# Patient Record
Sex: MALE | Race: WHITE | NOT HISPANIC OR LATINO | Employment: OTHER | URBAN - METROPOLITAN AREA
[De-identification: names, ages, dates, MRNs, and addresses within clinical notes are randomized per-mention and may not be internally consistent; named-entity substitution may affect disease eponyms.]

---

## 2023-08-21 PROBLEM — R82.90 ABNORMAL URINALYSIS: Status: ACTIVE | Noted: 2023-08-21

## 2023-08-21 PROBLEM — R97.20 ELEVATED PSA: Status: ACTIVE | Noted: 2023-08-21

## 2023-08-21 PROBLEM — C61 CANCER OF PROSTATE (MULTI): Status: ACTIVE | Noted: 2023-08-21

## 2023-08-21 PROBLEM — J31.0 RHINITIS: Status: ACTIVE | Noted: 2023-08-21

## 2023-08-21 PROBLEM — R73.03 PREDIABETES: Status: ACTIVE | Noted: 2023-08-21

## 2023-08-21 RX ORDER — FLUTICASONE PROPIONATE 50 MCG
2 SPRAY, SUSPENSION (ML) NASAL DAILY
COMMUNITY
Start: 2019-11-26 | End: 2024-06-04 | Stop reason: ALTCHOICE

## 2023-08-21 RX ORDER — PREDNISONE 5 MG/1
5 TABLET ORAL DAILY
COMMUNITY
Start: 2023-02-21 | End: 2023-10-04 | Stop reason: ALTCHOICE

## 2023-08-21 RX ORDER — CYANOCOBALAMIN (VITAMIN B-12) 500 MCG
400 TABLET ORAL DAILY
COMMUNITY
End: 2024-06-04 | Stop reason: WASHOUT

## 2023-08-21 RX ORDER — CETIRIZINE HYDROCHLORIDE 10 MG/1
10 TABLET ORAL DAILY
COMMUNITY
Start: 2019-11-26 | End: 2024-06-04 | Stop reason: ALTCHOICE

## 2023-10-02 ENCOUNTER — LAB (OUTPATIENT)
Dept: LAB | Facility: CLINIC | Age: 78
End: 2023-10-02
Payer: MEDICARE

## 2023-10-02 DIAGNOSIS — C61 PROSTATE CANCER (MULTI): ICD-10-CM

## 2023-10-02 DIAGNOSIS — C61 PROSTATE CANCER (MULTI): Primary | ICD-10-CM

## 2023-10-02 LAB
ALBUMIN SERPL BCP-MCNC: 4 G/DL (ref 3.4–5)
ALP SERPL-CCNC: 71 U/L (ref 33–136)
ALT SERPL W P-5'-P-CCNC: 14 U/L (ref 10–52)
ANION GAP SERPL CALC-SCNC: 10 MMOL/L (ref 10–20)
AST SERPL W P-5'-P-CCNC: 19 U/L (ref 9–39)
BASOPHILS # BLD AUTO: 0.03 X10*3/UL (ref 0–0.1)
BASOPHILS NFR BLD AUTO: 0.6 %
BILIRUB SERPL-MCNC: 0.4 MG/DL (ref 0–1.2)
BUN SERPL-MCNC: 23 MG/DL (ref 6–23)
CALCIUM SERPL-MCNC: 9.4 MG/DL (ref 8.6–10.3)
CHLORIDE SERPL-SCNC: 104 MMOL/L (ref 98–107)
CO2 SERPL-SCNC: 29 MMOL/L (ref 21–32)
CREAT SERPL-MCNC: 0.85 MG/DL (ref 0.5–1.3)
EOSINOPHIL # BLD AUTO: 0.28 X10*3/UL (ref 0–0.4)
EOSINOPHIL NFR BLD AUTO: 5.9 %
ERYTHROCYTE [DISTWIDTH] IN BLOOD BY AUTOMATED COUNT: 15.9 % (ref 11.5–14.5)
GFR SERPL CREATININE-BSD FRML MDRD: 90 ML/MIN/1.73M*2
GLUCOSE SERPL-MCNC: 97 MG/DL (ref 74–99)
HCT VFR BLD AUTO: 37.8 % (ref 41–52)
HGB BLD-MCNC: 12.2 G/DL (ref 13.5–17.5)
IMM GRANULOCYTES # BLD AUTO: 0.01 X10*3/UL (ref 0–0.5)
IMM GRANULOCYTES NFR BLD AUTO: 0.2 % (ref 0–0.9)
LYMPHOCYTES # BLD AUTO: 1.25 X10*3/UL (ref 0.8–3)
LYMPHOCYTES NFR BLD AUTO: 26.2 %
MCH RBC QN AUTO: 28.9 PG (ref 26–34)
MCHC RBC AUTO-ENTMCNC: 32.3 G/DL (ref 32–36)
MCV RBC AUTO: 90 FL (ref 80–100)
MONOCYTES # BLD AUTO: 0.62 X10*3/UL (ref 0.05–0.8)
MONOCYTES NFR BLD AUTO: 13 %
NEUTROPHILS # BLD AUTO: 2.58 X10*3/UL (ref 1.6–5.5)
NEUTROPHILS NFR BLD AUTO: 54.1 %
NRBC BLD-RTO: 0 /100 WBCS (ref 0–0)
PLATELET # BLD AUTO: 241 X10*3/UL (ref 150–450)
PMV BLD AUTO: 9.3 FL (ref 7.5–11.5)
POTASSIUM SERPL-SCNC: 4.4 MMOL/L (ref 3.5–5.3)
PROT SERPL-MCNC: 6.8 G/DL (ref 6.4–8.2)
PSA SERPL-MCNC: 0.31 NG/ML
RBC # BLD AUTO: 4.22 X10*6/UL (ref 4.5–5.9)
SODIUM SERPL-SCNC: 139 MMOL/L (ref 136–145)
WBC # BLD AUTO: 4.8 X10*3/UL (ref 4.4–11.3)

## 2023-10-02 PROCEDURE — 85025 COMPLETE CBC W/AUTO DIFF WBC: CPT

## 2023-10-02 PROCEDURE — 36415 COLL VENOUS BLD VENIPUNCTURE: CPT

## 2023-10-02 PROCEDURE — 84270 ASSAY OF SEX HORMONE GLOBUL: CPT

## 2023-10-02 PROCEDURE — 80053 COMPREHEN METABOLIC PANEL: CPT

## 2023-10-02 RX ORDER — DIPHENHYDRAMINE HYDROCHLORIDE 50 MG/ML
50 INJECTION INTRAMUSCULAR; INTRAVENOUS AS NEEDED
Status: CANCELLED | OUTPATIENT
Start: 2023-12-20

## 2023-10-02 RX ORDER — EPINEPHRINE 0.3 MG/.3ML
0.3 INJECTION SUBCUTANEOUS EVERY 5 MIN PRN
Status: CANCELLED | OUTPATIENT
Start: 2023-12-20

## 2023-10-02 RX ORDER — ALBUTEROL SULFATE 0.83 MG/ML
3 SOLUTION RESPIRATORY (INHALATION) AS NEEDED
Status: CANCELLED | OUTPATIENT
Start: 2023-12-20

## 2023-10-02 RX ORDER — FAMOTIDINE 10 MG/ML
20 INJECTION INTRAVENOUS ONCE AS NEEDED
Status: CANCELLED | OUTPATIENT
Start: 2023-12-20

## 2023-10-02 NOTE — PROGRESS NOTES
Patient ID: Anderson Gautam is a 78 y.o. male.  Diagnosis:  metastatic high volume [facility]  MedOnc: Dr. Hurley    Current Therapy: darolutamide and ADT    ONCOLOGIC HISTORY     Oncology History   Cancer of prostate (CMS/HCC)   2/11/2023 -  Chemotherapy    Darolutamide, 84 Day Cycles     8/21/2023 Initial Diagnosis    Cancer of prostate (CMS/HCC)        PCP Poli Soriano, seen last in 2019, at the time PSA was normal per patient.   Last PSA was 4.2 at age 71.      12/4/22 ED for groin and flank pain. CT A&P negative except prostatomegaly.  12/30/22 Returned ED for pain. Bone metastases found on CT of chest. PSA drawn 1050.4.  1/6/23 Prostate bx in office with Dr. Ruiz  1/17/23 ADT started/ Daro ordered  2/11/23 Darolutamide started  2/14/23 C1 Docetaxel, ADT/Daro  2/21/23 sick visit- Prednisone started for myalgias  3/7/23 C2 Docetaxel- 15% dose reduction for fatigue, myalgia, neuropathy  3/28/23 C3 docetaxel, continued reduced dose  4/18/23  C4 docetaxel, continued reduced dose  5/9/23   C5 docetaxel, continued reduced dose  5/30/23  ADT + darolutamide. No cycle 6 due to AEs  7/5/23 Continue darolutamide, ADT today  8/16/23 Decrease darolutamide to 600mg in AM and 300mg in PM for leg pain  10/4/23 Continue darolutamide at 600mg in AM and 300 mg in PM        Past Medical History: Anderson has a past medical history of Personal history of pneumonia (recurrent) (04/12/2017) and Prediabetes (05/11/2017).  Surgical History:  Anderson has no past surgical history on file.  Social History:  Anderson reports that he has never smoked. He has never been exposed to tobacco smoke. He has never used smokeless tobacco. He reports that he does not drink alcohol and does not use drugs.  Family History:    Family History   Problem Relation Name Age of Onset    Emphysema Mother      COPD Mother      Cancer Mother      Emphysema Father      COPD Father       Family Oncology History:  Cancer-related family history includes Cancer in  "his mother.        SUBJECTIVE:    History of Present Illness:  Anderson Gautam is a 78 y.o. 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 He is bowling often and exercising, riding his bike. He no longer has leg pain or edema. He is now sleeping, he tried melatoninn, it didn't work but with leg pain now gone he isn't having issues sleeping any longer. He feels like his strength is coming back. He was told her has blockage in his heart, [facility]. He is unsure of when follow up is for that, he has not started any new medications. He denies bone pain. No problems walking. Denies SOB and LATHAM. Has a good appetite, no weight issues, has gained his weight back. A little concerned about chest fat. He denies nausea, vomiting, constipation and diarrhea. Denies abd pain. No LUTS. Gets warm once in a while but tolerable.     Review of Systems   Constitutional: Negative.    HENT:  Negative.     Eyes: Negative.    Respiratory: Negative.     Cardiovascular: Negative.    Gastrointestinal: Negative.    Endocrine: Negative.    Genitourinary: Negative.     Musculoskeletal: Negative.    Skin: Negative.    Neurological: Negative.    Hematological: Negative.    Psychiatric/Behavioral: Negative.         OBJECTIVE:    VS / Pain:  /68   Pulse 65   Temp 36.3 °C (97.3 °F)   Resp 16   Ht (S) 1.709 m (5' 7.28\")   Wt 78.1 kg (172 lb 2.9 oz)   SpO2 99%   BMI 26.74 kg/m²   BSA: 1.93 meters squared   Pain Scale: 0    Physical Exam  Constitutional:       Appearance: Normal appearance.   HENT:      Head: Normocephalic and atraumatic.      Mouth/Throat:      Mouth: Mucous membranes are moist.      Pharynx: Oropharynx is clear.   Eyes:      Pupils: Pupils are equal, round, and reactive to light.   Musculoskeletal:         General: Normal range of motion.      Cervical back: Normal range of motion and neck supple.   Skin:     General: Skin is warm and dry. "   Neurological:      General: No focal deficit present.      Mental Status: He is alert and oriented to person, place, and time.   Psychiatric:         Mood and Affect: Mood normal.         Behavior: Behavior normal.         Thought Content: Thought content normal.         Judgment: Judgment normal.         Performance Status: ECOG Score: 1- Restricted in physically strenuous activity.  Carries out light duty.        Diagnostic Results   Results from last 7 days   Lab Units 10/02/23  1029   SODIUM mmol/L 139   POTASSIUM mmol/L 4.4   CHLORIDE mmol/L 104   CO2 mmol/L 29   BUN mg/dL 23   CREATININE mg/dL 0.85   CALCIUM mg/dL 9.4   PROTEIN TOTAL g/dL 6.8   BILIRUBIN TOTAL mg/dL 0.4   ALK PHOS U/L 71   ALT U/L 14   AST U/L 19   GLUCOSE mg/dL 97     Lab Results   Component Value Date    PSA 0.31 10/02/2023    PSA 0.57 06/30/2023    PSA 0.92 05/30/2023      Results from last 7 days   Lab Units 10/02/23  1029   WBC AUTO x10*3/uL 4.8   HEMOGLOBIN g/dL 12.2*   HEMATOCRIT % 37.8*   PLATELETS AUTO x10*3/uL 241   NEUTROS PCT AUTO % 54.1   LYMPHS PCT AUTO % 26.2   MONOS PCT AUTO % 13.0   EOS PCT AUTO % 5.9       Assessment/Plan     Diagnoses and all orders for this visit:  Cancer of prostate (CMS/HCC) (Primary)  -     Cancel: Clinic Appointment Request; Future  -     Cancel: Lactate dehydrogenase; Future  -     Clinic Appointment Request Follow Up; CHUCK PEGUERO; Future  -     CBC and Auto Differential; Future  -     Comprehensive metabolic panel; Future  -     PSA; Future  -     Clinic Appointment Request; Future  -     CBC and Auto Differential; Future  -     Comprehensive metabolic panel; Future  -     Lactate dehydrogenase; Future  -     PSA; Future  Other orders  -     leuprolide (6-month) (Eligard) injection 45 mg  -     sodium chloride 0.9 % bolus 500 mL  -     dextrose 5 % bolus 500 mL  -     diphenhydrAMINE (BENADryl) injection 50 mg  -     methylPREDNISolone sod succinate (PF) (SOLU-Medrol) 40 mg/mL injection 40  mg  -     famotidine PF (Pepcid) injection 20 mg  -     EPINEPHrine (Epipen) injection syringe 0.3 mg  -     albuterol 2.5 mg /3 mL (0.083 %) nebulizer solution 3 mL    76 yo  (retired , [place]) man found with PSA > 1000. He has metastatic high volume HSPC. On triple therapy with ADT and darolutamide and docetaxel,  last cycle of Docetaxel held due to AEs. Now on ADT with Eligard and Daro. Now tolerating a reduced dose of darolutamide after having leg pain and swelling.      Tempus CUONG Germline. Recommended cascade testing for his daughter.      Plan:     High Volume Prostate Cancer   - ADT Q 6 months- - due 12/19/2023  - Continue Darolutamide to 600mg in AM and 300mg in PM  - Continue Vitamin D and Ca  - Continue exercise     FUV with me in December with ADT injection. Labs prior.    Treatment Plan:  Darolutamide, 84 Day Cycles  Leuprolide Acetate, Every 24 Weeks    Lena Aguirre MSN, APRN, A-GNP-C, OCN   Oncology   [facility]  [address] [place] [id number]   [facility]  Phone: [phone]  [email or web address]

## 2023-10-04 ENCOUNTER — OFFICE VISIT (OUTPATIENT)
Dept: HEMATOLOGY/ONCOLOGY | Facility: HOSPITAL | Age: 78
End: 2023-10-04
Payer: MEDICARE

## 2023-10-04 VITALS
RESPIRATION RATE: 16 BRPM | BODY MASS INDEX: 27.02 KG/M2 | HEART RATE: 65 BPM | HEIGHT: 67 IN | OXYGEN SATURATION: 99 % | TEMPERATURE: 97.3 F | SYSTOLIC BLOOD PRESSURE: 128 MMHG | WEIGHT: 172.18 LBS | DIASTOLIC BLOOD PRESSURE: 68 MMHG

## 2023-10-04 DIAGNOSIS — C61 CANCER OF PROSTATE (MULTI): Primary | ICD-10-CM

## 2023-10-04 PROCEDURE — 1160F RVW MEDS BY RX/DR IN RCRD: CPT

## 2023-10-04 PROCEDURE — 1126F AMNT PAIN NOTED NONE PRSNT: CPT

## 2023-10-04 PROCEDURE — 99214 OFFICE O/P EST MOD 30 MIN: CPT

## 2023-10-04 PROCEDURE — 1036F TOBACCO NON-USER: CPT

## 2023-10-04 PROCEDURE — 1159F MED LIST DOCD IN RCRD: CPT

## 2023-10-04 ASSESSMENT — ENCOUNTER SYMPTOMS
GASTROINTESTINAL NEGATIVE: 1
RESPIRATORY NEGATIVE: 1
PSYCHIATRIC NEGATIVE: 1
NEUROLOGICAL NEGATIVE: 1
MUSCULOSKELETAL NEGATIVE: 1
HEMATOLOGIC/LYMPHATIC NEGATIVE: 1
ENDOCRINE NEGATIVE: 1
CONSTITUTIONAL NEGATIVE: 1
CARDIOVASCULAR NEGATIVE: 1
EYES NEGATIVE: 1

## 2023-10-04 ASSESSMENT — PAIN SCALES - GENERAL: PAINLEVEL: 0-NO PAIN

## 2023-10-06 LAB — TESTOSTERONE,TOTAL,LC-MS/MS: 8 NG/DL (ref 250–1100)

## 2023-10-20 ENCOUNTER — APPOINTMENT (OUTPATIENT)
Dept: HEMATOLOGY/ONCOLOGY | Facility: HOSPITAL | Age: 78
End: 2023-10-20
Payer: MEDICARE

## 2023-11-13 ENCOUNTER — TELEPHONE (OUTPATIENT)
Dept: ADMISSION | Facility: HOSPITAL | Age: 78
End: 2023-11-13
Payer: MEDICARE

## 2023-11-13 DIAGNOSIS — C61 CANCER OF PROSTATE (MULTI): Primary | ICD-10-CM

## 2023-11-13 NOTE — TELEPHONE ENCOUNTER
Pt states script for Nubeqa 300mg. 2 tablets in a.m. and 1 tablet in p.m. needs sent to [facility] by Brad (added to preferred pharmacy list). He has received confirmation that [facility] will be covering his prescription costs, but new script is needed.   Last FUV 10/4 with next FUV 12/19

## 2023-12-18 ENCOUNTER — LAB (OUTPATIENT)
Dept: LAB | Facility: CLINIC | Age: 78
End: 2023-12-18
Payer: MEDICARE

## 2023-12-18 DIAGNOSIS — C61 CANCER OF PROSTATE (MULTI): ICD-10-CM

## 2023-12-18 LAB
ALBUMIN SERPL BCP-MCNC: 4.3 G/DL (ref 3.4–5)
ALP SERPL-CCNC: 87 U/L (ref 33–136)
ALT SERPL W P-5'-P-CCNC: 15 U/L (ref 10–52)
ANION GAP SERPL CALC-SCNC: 11 MMOL/L (ref 10–20)
AST SERPL W P-5'-P-CCNC: 20 U/L (ref 9–39)
BASOPHILS # BLD AUTO: 0.03 X10*3/UL (ref 0–0.1)
BASOPHILS NFR BLD AUTO: 0.6 %
BILIRUB SERPL-MCNC: 0.5 MG/DL (ref 0–1.2)
BUN SERPL-MCNC: 22 MG/DL (ref 6–23)
CALCIUM SERPL-MCNC: 9.3 MG/DL (ref 8.6–10.3)
CHLORIDE SERPL-SCNC: 105 MMOL/L (ref 98–107)
CO2 SERPL-SCNC: 28 MMOL/L (ref 21–32)
CREAT SERPL-MCNC: 0.9 MG/DL (ref 0.5–1.3)
EOSINOPHIL # BLD AUTO: 0.22 X10*3/UL (ref 0–0.4)
EOSINOPHIL NFR BLD AUTO: 4.3 %
ERYTHROCYTE [DISTWIDTH] IN BLOOD BY AUTOMATED COUNT: 13.7 % (ref 11.5–14.5)
GFR SERPL CREATININE-BSD FRML MDRD: 89 ML/MIN/1.73M*2
GLUCOSE SERPL-MCNC: 107 MG/DL (ref 74–99)
HCT VFR BLD AUTO: 40.6 % (ref 41–52)
HGB BLD-MCNC: 13.4 G/DL (ref 13.5–17.5)
IMM GRANULOCYTES # BLD AUTO: 0.01 X10*3/UL (ref 0–0.5)
IMM GRANULOCYTES NFR BLD AUTO: 0.2 % (ref 0–0.9)
LYMPHOCYTES # BLD AUTO: 1.36 X10*3/UL (ref 0.8–3)
LYMPHOCYTES NFR BLD AUTO: 26.6 %
MCH RBC QN AUTO: 30.2 PG (ref 26–34)
MCHC RBC AUTO-ENTMCNC: 33 G/DL (ref 32–36)
MCV RBC AUTO: 91 FL (ref 80–100)
MONOCYTES # BLD AUTO: 0.58 X10*3/UL (ref 0.05–0.8)
MONOCYTES NFR BLD AUTO: 11.4 %
NEUTROPHILS # BLD AUTO: 2.91 X10*3/UL (ref 1.6–5.5)
NEUTROPHILS NFR BLD AUTO: 56.9 %
NRBC BLD-RTO: 0 /100 WBCS (ref 0–0)
PLATELET # BLD AUTO: 249 X10*3/UL (ref 150–450)
POTASSIUM SERPL-SCNC: 4.5 MMOL/L (ref 3.5–5.3)
PROT SERPL-MCNC: 6.7 G/DL (ref 6.4–8.2)
PSA SERPL-MCNC: 0.21 NG/ML
RBC # BLD AUTO: 4.44 X10*6/UL (ref 4.5–5.9)
SODIUM SERPL-SCNC: 139 MMOL/L (ref 136–145)
WBC # BLD AUTO: 5.1 X10*3/UL (ref 4.4–11.3)

## 2023-12-18 PROCEDURE — 36415 COLL VENOUS BLD VENIPUNCTURE: CPT

## 2023-12-18 PROCEDURE — 84402 ASSAY OF FREE TESTOSTERONE: CPT

## 2023-12-18 PROCEDURE — 85025 COMPLETE CBC W/AUTO DIFF WBC: CPT

## 2023-12-18 PROCEDURE — 84153 ASSAY OF PSA TOTAL: CPT | Mod: PARLAB

## 2023-12-18 PROCEDURE — 80053 COMPREHEN METABOLIC PANEL: CPT

## 2023-12-18 NOTE — PROGRESS NOTES
Patient ID: Anderson Gautam is a 78 y.o. male.  Diagnosis:  metastatic high volume [facility]  MedOnc: Dr. Hurley  PCP: Poli Soriano    Patient Care Team:  Poli Soriano MD as PCP - General  Srinath Hurley MD as Consulting Physician (Hematology and Oncology)  KATY Rico-CNP as Nurse Practitioner (Hematology and Oncology)    Current Therapy: darolutamide + ADT    ONCOLOGIC HISTORY  PCP Poli Soriano, seen last in 2019, at the time PSA was normal per patient.   Last PSA was 4.2 at age 71.      12/4/22 ED for groin and flank pain. CT A&P negative except prostatomegaly.  12/30/22 Returned ED for pain. Bone metastases found on CT of chest. PSA drawn 1050.4.  1/6/23 Prostate bx in office with Dr. Ruiz  1/17/23 ADT started/ Daro ordered  2/11/23 Darolutamide started  2/14/23 C1 Docetaxel, ADT/Daro  2/21/23 sick visit- Prednisone started for myalgias  3/7/23 C2 Docetaxel- 15% dose reduction for fatigue, myalgia, neuropathy  3/28/23 C3 docetaxel, continued reduced dose  4/18/23  C4 docetaxel, continued reduced dose  5/9/23   C5 docetaxel, continued reduced dose  5/30/23  ADT + darolutamide. No cycle 6 due to AEs  7/5/23 Continue darolutamide, ADT today  8/16/23 Decrease darolutamide to 600mg in AM and 300mg in PM for leg pain  10/4/23 Continue darolutamide at 600mg in AM and 300 mg in PM  12/19/23: Continue darolutamide at reduced dose    Oncology History   Cancer of prostate (CMS/HCC)   2/11/2023 -  Chemotherapy    Darolutamide, 84 Day Cycles     8/21/2023 Initial Diagnosis    Cancer of prostate (CMS/HCC)          Past Medical History: Anderson has a past medical history of Personal history of pneumonia (recurrent) (04/12/2017) and Prediabetes (05/11/2017).  Surgical History:  Anderson has no past surgical history on file.  Social History:  Anderson reports that he has never smoked. He has never been exposed to tobacco smoke. He has never used smokeless tobacco. He reports that he does not drink  alcohol and does not use drugs.  Family History:    Family History   Problem Relation Name Age of Onset    Emphysema Mother      COPD Mother      Cancer Mother      Emphysema Father      COPD Father       Family Oncology History:  Cancer-related family history includes Cancer in his mother.    SUBJECTIVE:    History of Present Illness:  Anderson Gautam is a 78 y.o. male who presents today for follow up of [facility]. Patient of Dr. Hurley currently on ADT and darolutamide.   Tolerating treatment well.   No Hospitalizations/ED visits.   Denies SOB and LATHAM.   He denies nausea, vomiting, constipation and diarrhea.   Denies abd pain. No LUTS.   Gets warm once in a while but tolerable.   On reduced dose darolutamide and tolerating this dose well.   Eating veggies and fruit.  Feels good  No pain.  Gaining weight from working out 3 times a week.  Bowling and walking and biking.   Belly fat, can't rid of.  Sleeping well.  Redness in ankles still.      Review of Systems   Constitutional:  Negative for appetite change, chills, fatigue, fever and unexpected weight change.   HENT:  Negative.     Eyes: Negative.    Respiratory:  Negative for cough and shortness of breath.    Cardiovascular:  Negative for chest pain and leg swelling.   Gastrointestinal:  Negative for abdominal pain, constipation, diarrhea, nausea and vomiting.   Endocrine: Positive for hot flashes.   Genitourinary:  Negative for difficulty urinating, dysuria and hematuria.    Musculoskeletal:  Negative for arthralgias, back pain, myalgias and neck pain.   Skin:  Negative for itching and rash.   Neurological:  Negative for dizziness, extremity weakness, headaches and numbness.   Hematological: Negative.    Psychiatric/Behavioral: Negative.       Allergies  Allergies   Allergen Reactions    Morphine Swelling      Medications  Current Outpatient Medications   Medication Instructions    ascorbic acid (VITAMIN C) 250 mg, oral, 2 times daily    calcium carbonate 400  mg, oral    cetirizine (ZYRTEC) 10 mg, oral, Daily    cholecalciferol (VITAMIN D-3) 400 Units, oral, Daily    darolutamide (Nubeqa) 300 mg tablet Take 2 tablets in the AM and 1 tablet in the PM. Take with or without food. Do not crush, chew, dissolve, or open. Swallow it whole.    fluticasone (Flonase) 50 mcg/actuation nasal spray 2 sprays, Each Nostril, Daily        OBJECTIVE:    VS / Pain:  /64   Pulse 71   Temp 36.5 °C (97.7 °F) (Core)   Resp 20   Wt 79.4 kg (175 lb 0.7 oz)   SpO2 99%   BMI 27.19 kg/m²   BSA: 1.94 meters squared  Wt Readings from Last 5 Encounters:   12/19/23 79.4 kg (175 lb 0.7 oz)   10/04/23 78.1 kg (172 lb 2.9 oz)   04/21/23 79.9 kg (176 lb 1 oz)   03/28/23 77.7 kg (171 lb 4.8 oz)   03/07/23 73.8 kg (162 lb 11.2 oz)      Pain Scale: 0    Physical Exam  Constitutional:       General: He is awake. He is not in acute distress.     Appearance: Normal appearance.   HENT:      Head: Normocephalic and atraumatic.      Nose: Nose normal.      Mouth/Throat:      Mouth: Mucous membranes are moist. No oral lesions.      Tongue: No lesions.   Eyes:      Pupils: Pupils are equal, round, and reactive to light.   Cardiovascular:      Rate and Rhythm: Normal rate and regular rhythm.      Heart sounds: Normal heart sounds, S1 normal and S2 normal. No murmur heard.  Pulmonary:      Effort: Pulmonary effort is normal.      Breath sounds: Normal breath sounds and air entry.   Abdominal:      General: Bowel sounds are normal. There is no distension.      Palpations: Abdomen is soft.      Tenderness: There is no abdominal tenderness. There is no guarding.   Musculoskeletal:      Cervical back: Full passive range of motion without pain.      Right lower leg: No edema.      Left lower leg: No edema.   Lymphadenopathy:      Head:      Right side of head: No submental, submandibular, tonsillar, preauricular, posterior auricular or occipital adenopathy.      Left side of head: No submental, submandibular,  tonsillar, preauricular, posterior auricular or occipital adenopathy.      Cervical: No cervical adenopathy.      Right cervical: No superficial cervical adenopathy.     Left cervical: No superficial cervical adenopathy.      Upper Body:      Right upper body: No supraclavicular adenopathy.      Left upper body: No supraclavicular adenopathy.   Skin:     General: Skin is warm and dry.      Capillary Refill: Capillary refill takes less than 2 seconds.      Findings: No rash.   Neurological:      General: No focal deficit present.      Mental Status: He is alert and oriented to person, place, and time.      Motor: Motor function is intact.      Gait: Gait is intact.   Psychiatric:         Attention and Perception: Attention normal.         Mood and Affect: Mood normal.         Speech: Speech normal.         Behavior: Behavior normal. Behavior is cooperative.         Thought Content: Thought content normal.         Cognition and Memory: Cognition and memory normal.         Judgment: Judgment normal.     Performance Status:  ECOG Score: 0- Fully active, able to carry on all pre-disease performance w/o restriction.  Karnofsky Score: 90 - Able to carry on normal activity; minor signs or symptoms of disease     Diagnostic Results   Results for orders placed or performed in visit on 12/18/23 (from the past 96 hour(s))   PSA   Result Value Ref Range    Prostate Specific AG 0.21 <=4.00 ng/mL   CBC and Auto Differential   Result Value Ref Range    WBC 5.1 4.4 - 11.3 x10*3/uL    nRBC 0.0 0.0 - 0.0 /100 WBCs    RBC 4.44 (L) 4.50 - 5.90 x10*6/uL    Hemoglobin 13.4 (L) 13.5 - 17.5 g/dL    Hematocrit 40.6 (L) 41.0 - 52.0 %    MCV 91 80 - 100 fL    MCH 30.2 26.0 - 34.0 pg    MCHC 33.0 32.0 - 36.0 g/dL    RDW 13.7 11.5 - 14.5 %    Platelets 249 150 - 450 x10*3/uL    Neutrophils % 56.9 40.0 - 80.0 %    Immature Granulocytes %, Automated 0.2 0.0 - 0.9 %    Lymphocytes % 26.6 13.0 - 44.0 %    Monocytes % 11.4 2.0 - 10.0 %    Eosinophils  % 4.3 0.0 - 6.0 %    Basophils % 0.6 0.0 - 2.0 %    Neutrophils Absolute 2.91 1.60 - 5.50 x10*3/uL    Immature Granulocytes Absolute, Automated 0.01 0.00 - 0.50 x10*3/uL    Lymphocytes Absolute 1.36 0.80 - 3.00 x10*3/uL    Monocytes Absolute 0.58 0.05 - 0.80 x10*3/uL    Eosinophils Absolute 0.22 0.00 - 0.40 x10*3/uL    Basophils Absolute 0.03 0.00 - 0.10 x10*3/uL   Comprehensive metabolic panel   Result Value Ref Range    Glucose 107 (H) 74 - 99 mg/dL    Sodium 139 136 - 145 mmol/L    Potassium 4.5 3.5 - 5.3 mmol/L    Chloride 105 98 - 107 mmol/L    Bicarbonate 28 21 - 32 mmol/L    Anion Gap 11 10 - 20 mmol/L    Urea Nitrogen 22 6 - 23 mg/dL    Creatinine 0.90 0.50 - 1.30 mg/dL    eGFR 89 >60 mL/min/1.73m*2    Calcium 9.3 8.6 - 10.3 mg/dL    Albumin 4.3 3.4 - 5.0 g/dL    Alkaline Phosphatase 87 33 - 136 U/L    Total Protein 6.7 6.4 - 8.2 g/dL    AST 20 9 - 39 U/L    Bilirubin, Total 0.5 0.0 - 1.2 mg/dL    ALT 15 10 - 52 U/L     Lab Results   Component Value Date    PSA 0.21 12/18/2023    PSA 0.31 10/02/2023    PSA 0.57 06/30/2023     Lab Results   Component Value Date    TESTOTOTMS 8 (L) 10/02/2023     Lab Results   Component Value Date    TESTOSTERONE <60 (L) 05/09/2023     Assessment/Plan   76 yo  (retired , [place]) man found with PSA > 1000. He has metastatic high volume HSPC. On triple therapy with ADT and darolutamide and docetaxel,  last cycle of Docetaxel held due to AEs. Now on ADT with Eligard and Daro. Now tolerating a reduced dose of darolutamide after having leg pain and swelling. No new concerns.      Tempus CUONG Germline. Recommended cascade testing for his daughter.     No matching staging information was found for the patient.  Anderson was seen today for follow-up.  Diagnoses and all orders for this visit:  Cancer of prostate (CMS/HCC) (Primary)  -     CT chest abdomen pelvis w IV contrast; Future  -     NM bone 3 phase; Future  -     Clinic Appointment Request Follow Up; JUAN RAMON  RAINE SHEA; SCC 1F MEDONC1; Future  -     CBC and Auto Differential; Future  -     Comprehensive Metabolic Panel; Future  -     Prostate Specific Antigen; Future  -     Testosterone; Future  -     Clinic Appointment Request Follow Up; CHUCK PEGUERO    Treatment Plan:  Darolutamide, 84 Day Cycles  Leuprolide Acetate, Every 24 Weeks  High Volume Prostate Cancer   - ADT Q 6 months- due today  - Continue Darolutamide to 600mg in AM and 300mg in PM  - Continue Vitamin D and Ca  - Continue exercise   - Scans are due - CT CAP and WBBS prior to next visit    FUV with MD in March after scans. Labs prior.     Patient verbalizes understanding of above plan. Time provided for patient's questions. All questions answered to patient's satisfaction in office. Patient instructed to reach out for any new concerning issues at [phone].    Chuck Peguero MSN, APRN, A-GNP-C, OCN   Oncology   [address] [address]   [employer]   Phone: [phone]  [email or web address]

## 2023-12-19 ENCOUNTER — INFUSION (OUTPATIENT)
Dept: HEMATOLOGY/ONCOLOGY | Facility: HOSPITAL | Age: 78
End: 2023-12-19
Payer: MEDICARE

## 2023-12-19 ENCOUNTER — OFFICE VISIT (OUTPATIENT)
Dept: HEMATOLOGY/ONCOLOGY | Facility: HOSPITAL | Age: 78
End: 2023-12-19
Payer: MEDICARE

## 2023-12-19 VITALS
RESPIRATION RATE: 20 BRPM | TEMPERATURE: 97.7 F | SYSTOLIC BLOOD PRESSURE: 150 MMHG | WEIGHT: 175.04 LBS | BODY MASS INDEX: 27.19 KG/M2 | OXYGEN SATURATION: 99 % | DIASTOLIC BLOOD PRESSURE: 64 MMHG | HEART RATE: 71 BPM

## 2023-12-19 DIAGNOSIS — C61 CANCER OF PROSTATE (MULTI): Primary | ICD-10-CM

## 2023-12-19 DIAGNOSIS — C61 CANCER OF PROSTATE (MULTI): ICD-10-CM

## 2023-12-19 PROCEDURE — 1126F AMNT PAIN NOTED NONE PRSNT: CPT

## 2023-12-19 PROCEDURE — 1159F MED LIST DOCD IN RCRD: CPT

## 2023-12-19 PROCEDURE — 96402 CHEMO HORMON ANTINEOPL SQ/IM: CPT

## 2023-12-19 PROCEDURE — 99213 OFFICE O/P EST LOW 20 MIN: CPT

## 2023-12-19 PROCEDURE — 1036F TOBACCO NON-USER: CPT

## 2023-12-19 PROCEDURE — 1160F RVW MEDS BY RX/DR IN RCRD: CPT

## 2023-12-19 PROCEDURE — 2500000004 HC RX 250 GENERAL PHARMACY W/ HCPCS (ALT 636 FOR OP/ED): Mod: JZ

## 2023-12-19 RX ORDER — ALBUTEROL SULFATE 0.83 MG/ML
3 SOLUTION RESPIRATORY (INHALATION) AS NEEDED
Status: CANCELLED | OUTPATIENT
Start: 2024-03-11

## 2023-12-19 RX ORDER — FAMOTIDINE 10 MG/ML
20 INJECTION INTRAVENOUS ONCE AS NEEDED
Status: CANCELLED | OUTPATIENT
Start: 2024-03-11

## 2023-12-19 RX ORDER — ASCORBIC ACID 250 MG
250 TABLET ORAL 2 TIMES DAILY
COMMUNITY

## 2023-12-19 RX ORDER — EPINEPHRINE 0.3 MG/.3ML
0.3 INJECTION SUBCUTANEOUS EVERY 5 MIN PRN
Status: CANCELLED | OUTPATIENT
Start: 2024-03-11

## 2023-12-19 RX ORDER — DIPHENHYDRAMINE HYDROCHLORIDE 50 MG/ML
50 INJECTION INTRAMUSCULAR; INTRAVENOUS AS NEEDED
Status: CANCELLED | OUTPATIENT
Start: 2024-03-11

## 2023-12-19 RX ADMIN — LEUPROLIDE ACETATE 45 MG: KIT SUBCUTANEOUS at 16:01

## 2023-12-19 ASSESSMENT — ENCOUNTER SYMPTOMS
NUMBNESS: 0
DIZZINESS: 0
HEMATOLOGIC/LYMPHATIC NEGATIVE: 1
DYSURIA: 0
DEPRESSION: 0
LOSS OF SENSATION IN FEET: 0
LEG SWELLING: 0
ABDOMINAL PAIN: 0
COUGH: 0
NAUSEA: 0
BACK PAIN: 0
DIARRHEA: 0
HEADACHES: 0
HOT FLASHES: 1
UNEXPECTED WEIGHT CHANGE: 0
VOMITING: 0
PSYCHIATRIC NEGATIVE: 1
DIFFICULTY URINATING: 0
NECK PAIN: 0
HEMATURIA: 0
CHILLS: 0
APPETITE CHANGE: 0
MYALGIAS: 0
OCCASIONAL FEELINGS OF UNSTEADINESS: 0
FEVER: 0
ARTHRALGIAS: 0
FATIGUE: 0
EXTREMITY WEAKNESS: 0
SHORTNESS OF BREATH: 0
CONSTIPATION: 0
EYES NEGATIVE: 1

## 2023-12-19 ASSESSMENT — PAIN SCALES - GENERAL: PAINLEVEL: 0-NO PAIN

## 2023-12-19 ASSESSMENT — PATIENT HEALTH QUESTIONNAIRE - PHQ9
1. LITTLE INTEREST OR PLEASURE IN DOING THINGS: NOT AT ALL
SUM OF ALL RESPONSES TO PHQ9 QUESTIONS 1 AND 2: 0
2. FEELING DOWN, DEPRESSED OR HOPELESS: NOT AT ALL

## 2023-12-19 ASSESSMENT — COLUMBIA-SUICIDE SEVERITY RATING SCALE - C-SSRS
6. HAVE YOU EVER DONE ANYTHING, STARTED TO DO ANYTHING, OR PREPARED TO DO ANYTHING TO END YOUR LIFE?: NO
2. HAVE YOU ACTUALLY HAD ANY THOUGHTS OF KILLING YOURSELF?: NO
1. IN THE PAST MONTH, HAVE YOU WISHED YOU WERE DEAD OR WISHED YOU COULD GO TO SLEEP AND NOT WAKE UP?: NO

## 2023-12-20 ENCOUNTER — SPECIALTY PHARMACY (OUTPATIENT)
Dept: PHARMACY | Facility: CLINIC | Age: 78
End: 2023-12-20

## 2023-12-23 LAB
TESTOSTERONE FREE (CHAN): 0.6 PG/ML (ref 30–135)
TESTOSTERONE,TOTAL,LC-MS/MS: 8 NG/DL (ref 250–1100)

## 2024-03-08 ENCOUNTER — APPOINTMENT (OUTPATIENT)
Dept: CARDIOLOGY | Facility: HOSPITAL | Age: 79
End: 2024-03-08
Payer: MEDICARE

## 2024-03-08 ENCOUNTER — HOSPITAL ENCOUNTER (EMERGENCY)
Facility: HOSPITAL | Age: 79
Discharge: HOME | End: 2024-03-08
Attending: EMERGENCY MEDICINE
Payer: MEDICARE

## 2024-03-08 ENCOUNTER — APPOINTMENT (OUTPATIENT)
Dept: RADIOLOGY | Facility: HOSPITAL | Age: 79
End: 2024-03-08
Payer: MEDICARE

## 2024-03-08 VITALS
OXYGEN SATURATION: 95 % | WEIGHT: 175 LBS | HEART RATE: 59 BPM | TEMPERATURE: 100.2 F | HEIGHT: 68 IN | SYSTOLIC BLOOD PRESSURE: 105 MMHG | RESPIRATION RATE: 16 BRPM | DIASTOLIC BLOOD PRESSURE: 59 MMHG | BODY MASS INDEX: 26.52 KG/M2

## 2024-03-08 DIAGNOSIS — U07.1 COVID-19: Primary | ICD-10-CM

## 2024-03-08 LAB
ALBUMIN SERPL BCP-MCNC: 3.9 G/DL (ref 3.4–5)
ALP SERPL-CCNC: 72 U/L (ref 33–136)
ALT SERPL W P-5'-P-CCNC: 22 U/L (ref 10–52)
ANION GAP SERPL CALC-SCNC: 13 MMOL/L (ref 10–20)
AST SERPL W P-5'-P-CCNC: 29 U/L (ref 9–39)
BASOPHILS # BLD AUTO: 0.02 X10*3/UL (ref 0–0.1)
BASOPHILS NFR BLD AUTO: 0.4 %
BILIRUB SERPL-MCNC: 0.5 MG/DL (ref 0–1.2)
BUN SERPL-MCNC: 17 MG/DL (ref 6–23)
CALCIUM SERPL-MCNC: 8.9 MG/DL (ref 8.6–10.3)
CHLORIDE SERPL-SCNC: 98 MMOL/L (ref 98–107)
CO2 SERPL-SCNC: 26 MMOL/L (ref 21–32)
CREAT SERPL-MCNC: 0.88 MG/DL (ref 0.5–1.3)
EGFRCR SERPLBLD CKD-EPI 2021: 89 ML/MIN/1.73M*2
EOSINOPHIL # BLD AUTO: 0 X10*3/UL (ref 0–0.4)
EOSINOPHIL NFR BLD AUTO: 0 %
ERYTHROCYTE [DISTWIDTH] IN BLOOD BY AUTOMATED COUNT: 14.6 % (ref 11.5–14.5)
FLUAV RNA RESP QL NAA+PROBE: NOT DETECTED
FLUBV RNA RESP QL NAA+PROBE: NOT DETECTED
GLUCOSE SERPL-MCNC: 92 MG/DL (ref 74–99)
HCT VFR BLD AUTO: 36.9 % (ref 41–52)
HGB BLD-MCNC: 12.7 G/DL (ref 13.5–17.5)
IMM GRANULOCYTES # BLD AUTO: 0.01 X10*3/UL (ref 0–0.5)
IMM GRANULOCYTES NFR BLD AUTO: 0.2 % (ref 0–0.9)
LACTATE SERPL-SCNC: 0.7 MMOL/L (ref 0.4–2)
LYMPHOCYTES # BLD AUTO: 0.92 X10*3/UL (ref 0.8–3)
LYMPHOCYTES NFR BLD AUTO: 19.5 %
MAGNESIUM SERPL-MCNC: 1.78 MG/DL (ref 1.6–2.4)
MCH RBC QN AUTO: 30.2 PG (ref 26–34)
MCHC RBC AUTO-ENTMCNC: 34.4 G/DL (ref 32–36)
MCV RBC AUTO: 88 FL (ref 80–100)
MONOCYTES # BLD AUTO: 0.65 X10*3/UL (ref 0.05–0.8)
MONOCYTES NFR BLD AUTO: 13.8 %
NEUTROPHILS # BLD AUTO: 3.12 X10*3/UL (ref 1.6–5.5)
NEUTROPHILS NFR BLD AUTO: 66.1 %
NRBC BLD-RTO: 0 /100 WBCS (ref 0–0)
PLATELET # BLD AUTO: 167 X10*3/UL (ref 150–450)
POTASSIUM SERPL-SCNC: 4.1 MMOL/L (ref 3.5–5.3)
PROT SERPL-MCNC: 6.7 G/DL (ref 6.4–8.2)
RBC # BLD AUTO: 4.2 X10*6/UL (ref 4.5–5.9)
RSV RNA RESP QL NAA+PROBE: NOT DETECTED
SARS-COV-2 RNA RESP QL NAA+PROBE: DETECTED
SODIUM SERPL-SCNC: 133 MMOL/L (ref 136–145)
WBC # BLD AUTO: 4.7 X10*3/UL (ref 4.4–11.3)

## 2024-03-08 PROCEDURE — 93005 ELECTROCARDIOGRAM TRACING: CPT

## 2024-03-08 PROCEDURE — 87637 SARSCOV2&INF A&B&RSV AMP PRB: CPT | Performed by: NURSE PRACTITIONER

## 2024-03-08 PROCEDURE — 85025 COMPLETE CBC W/AUTO DIFF WBC: CPT | Performed by: NURSE PRACTITIONER

## 2024-03-08 PROCEDURE — 99283 EMERGENCY DEPT VISIT LOW MDM: CPT | Mod: 25

## 2024-03-08 PROCEDURE — 2500000004 HC RX 250 GENERAL PHARMACY W/ HCPCS (ALT 636 FOR OP/ED): Performed by: NURSE PRACTITIONER

## 2024-03-08 PROCEDURE — 83605 ASSAY OF LACTIC ACID: CPT | Performed by: NURSE PRACTITIONER

## 2024-03-08 PROCEDURE — 2500000002 HC RX 250 W HCPCS SELF ADMINISTERED DRUGS (ALT 637 FOR MEDICARE OP, ALT 636 FOR OP/ED): Performed by: NURSE PRACTITIONER

## 2024-03-08 PROCEDURE — 36415 COLL VENOUS BLD VENIPUNCTURE: CPT | Performed by: NURSE PRACTITIONER

## 2024-03-08 PROCEDURE — 71045 X-RAY EXAM CHEST 1 VIEW: CPT

## 2024-03-08 PROCEDURE — 71045 X-RAY EXAM CHEST 1 VIEW: CPT | Performed by: RADIOLOGY

## 2024-03-08 PROCEDURE — 96361 HYDRATE IV INFUSION ADD-ON: CPT

## 2024-03-08 PROCEDURE — 87040 BLOOD CULTURE FOR BACTERIA: CPT | Mod: PARLAB | Performed by: NURSE PRACTITIONER

## 2024-03-08 PROCEDURE — 83735 ASSAY OF MAGNESIUM: CPT | Performed by: NURSE PRACTITIONER

## 2024-03-08 PROCEDURE — 96360 HYDRATION IV INFUSION INIT: CPT

## 2024-03-08 PROCEDURE — 80053 COMPREHEN METABOLIC PANEL: CPT | Performed by: NURSE PRACTITIONER

## 2024-03-08 RX ORDER — ACETAMINOPHEN 325 MG/1
650 TABLET ORAL ONCE
Status: COMPLETED | OUTPATIENT
Start: 2024-03-08 | End: 2024-03-08

## 2024-03-08 RX ORDER — AZITHROMYCIN 250 MG/1
250 TABLET, FILM COATED ORAL DAILY
Qty: 4 TABLET | Refills: 0 | Status: SHIPPED | OUTPATIENT
Start: 2024-03-08 | End: 2024-03-12

## 2024-03-08 RX ORDER — AZITHROMYCIN 250 MG/1
500 TABLET, FILM COATED ORAL ONCE
Status: COMPLETED | OUTPATIENT
Start: 2024-03-08 | End: 2024-03-08

## 2024-03-08 RX ORDER — SODIUM CHLORIDE 9 MG/ML
75 INJECTION, SOLUTION INTRAVENOUS CONTINUOUS
Status: DISCONTINUED | OUTPATIENT
Start: 2024-03-08 | End: 2024-03-08 | Stop reason: HOSPADM

## 2024-03-08 RX ADMIN — ACETAMINOPHEN 650 MG: 325 TABLET ORAL at 11:34

## 2024-03-08 RX ADMIN — AZITHROMYCIN DIHYDRATE 500 MG: 250 TABLET ORAL at 14:05

## 2024-03-08 RX ADMIN — SODIUM CHLORIDE 1000 ML: 9 INJECTION, SOLUTION INTRAVENOUS at 11:35

## 2024-03-08 ASSESSMENT — COLUMBIA-SUICIDE SEVERITY RATING SCALE - C-SSRS
1. IN THE PAST MONTH, HAVE YOU WISHED YOU WERE DEAD OR WISHED YOU COULD GO TO SLEEP AND NOT WAKE UP?: NO
6. HAVE YOU EVER DONE ANYTHING, STARTED TO DO ANYTHING, OR PREPARED TO DO ANYTHING TO END YOUR LIFE?: NO
2. HAVE YOU ACTUALLY HAD ANY THOUGHTS OF KILLING YOURSELF?: NO

## 2024-03-08 ASSESSMENT — PAIN - FUNCTIONAL ASSESSMENT: PAIN_FUNCTIONAL_ASSESSMENT: 0-10

## 2024-03-08 ASSESSMENT — PAIN SCALES - GENERAL: PAINLEVEL_OUTOF10: 0 - NO PAIN

## 2024-03-08 NOTE — DISCHARGE INSTRUCTIONS
You are positive for COVID-19, this is the source of your symptoms.  You are also being treated for a possible developing pneumonia with Zithromax.  First dose given in the emergency department.  Remainder 4 doses were sent to your pharmacy.  Increase fluids.  Rest.  Tylenol.  Resume normal medications.  Please follow-up with your PCP in the next 2 to 3 days.  Return to the emergency room symptoms worsen.

## 2024-03-08 NOTE — ED TRIAGE NOTES
Patient states chest congestion, cough with yellow/brown sputum, fatigue and generalized weakness x4 days. Decreased appetite and constipation x2 days. Currently being treated for cancer.

## 2024-03-08 NOTE — ED PROVIDER NOTES
Limitations to History: None     HPI:      Anderson Gautam is a 78 y.o.  male with significant past medical history for diabetes and prostate CA treated with successfully with chemotherapy, current workup for bone metastasis on Nubeqa, presenting to ED today from home by himself for evaluation of flulike symptoms.  Over last few days the patient has had a headache, occasionally productive cough of yellow phlegm and nasal congestion.  Positive sick family members with similar symptoms.  No medication taken prior to arrival.  Denies fever/chills, sore throat, chest pain, shortness of breath, nausea/vomiting, abdominal pain, urinary symptoms, change in bowel habits or any other complaints.  No smoking, EtOH or drug use.  Oncologist is Dr. Stapleton.     Additional History Obtained from: None    ------------------------------------------------------------------------------------------------------------------------------------------    VS: As documented in the triage note and EMR flowsheet from this visit were reviewed.    Physical Exam:  Gen: Pleasant elderly  male, nontoxic looking.  Awake, alert and oriented x 3.  Appears slightly tired.  Well-nourished and hydrated.  Head/Neck: NCAT, neck w/ FROM  Eyes: EOMI, PERRL, anicteric sclerae, noninjected conjunctivae  Ears: TMs clear b/l without sign of infection  Nose: Nares patent w/o rhinorrhea  Mouth:  MMM, no OP lesions noted  Heart: RRR no MRG  Lungs: CTA b/l no RRW, no increased work of breathing  Abdomen: soft, NT, ND, no HSM, no palpable masses  Musculoskeletal: Movement of extremities x 4.  MSPs intact.  Skin intact.  No deformities.  Neurologic: Alert, symmetrical facies, phonates clearly, moves all extremities equally, responsive to touch, ambulates normally   Skin: Pink warm and dry.  No erythema, edema or ecchymosis.  No rashes noted  Psychological: calm, no  SI/HI      ------------------------------------------------------------------------------------------------------------------------------------------    Medical Decision Making: Pleasant elderly  male with history of diabetes and prostate CA with current workup for bone metastasis is evaluated at the bedside for flulike symptom headache, mild cough and nasal congestion x 3 days.  On arrival to the ED, febrile at 100.2.  Blood pressure 157/76 with a heart rate of 75, O2 sat 97%.  Lungs clear and abdomen nontender with bowel sounds.  Differential includes but is not limited to COVID/influenza/RSV or pneumonia.  IV established, basic labs with lactate/cultures, UA/urine culture, EKG and chest x-ray will be performed.  1 L normal saline wide open with maintenance rate to follow.  Tylenol given for fever and pain.    ED Course as of 03/08/24 1336   Fri Mar 08, 2024   1259 Laboratory studies were reviewed, no leukocytosis or evidence of anemia.  Normal kidney function and LFTs.  Sodium is slightly low at 133, patient did receive fluids.  Influenza negative.  COVID is positive.  Lactate 0.7, blood cultures are pending.  Chest x-ray shows vague patchy infiltrates in the left lung base. [SB]   1331 Due to the patient's history of prostate cancer with possible bone metastasis, I will treat him for possible developing pneumonia with Zithromax.  Loading dose given in emergency department.  Remainder sent to the patient's pharmacy.  Repeat vital signs within normal limits.  Lungs clear, no respiratory distress.  Follow-up patient is taking oral fluids.  He would like to be discharged home and I feel comfortable with close follow-up.  Increase fluids.  Rest.  Tylenol for pain.  Follow-up with PCP in the next 2 to 3 days.  Resume normal medications.  Return precautions discussed.  Diagnosis, treatment and plan discussed with patient, he verbalizes understanding and is in agreement.  Condition stable for discharge. [SB]       ED Course User Index  [SB] KATY Salas-CNP         Diagnoses as of 03/08/24 1336   COVID-19       EKG interpreted by Dr. Doyel,,    Chronic Medical Conditions Significantly Affecting Care: None    External Records Reviewed: I reviewed recent and relevant outside records including: None    Discussion of Management with Other Providers: Seen and evaluated with ED attending physician, Dr. Doyle, he agrees with the treatment plan, disposition of the patient.           KATY Salas-MARITO  03/09/24 2295

## 2024-03-12 ENCOUNTER — APPOINTMENT (OUTPATIENT)
Dept: RADIOLOGY | Facility: HOSPITAL | Age: 79
End: 2024-03-12
Payer: MEDICARE

## 2024-03-12 LAB
BACTERIA BLD CULT: NORMAL
BACTERIA BLD CULT: NORMAL

## 2024-03-14 ENCOUNTER — HOSPITAL ENCOUNTER (OUTPATIENT)
Dept: RADIOLOGY | Facility: HOSPITAL | Age: 79
Discharge: HOME | End: 2024-03-14
Payer: MEDICARE

## 2024-03-14 ENCOUNTER — HOSPITAL ENCOUNTER (OUTPATIENT)
Dept: RADIOLOGY | Facility: HOSPITAL | Age: 79
End: 2024-03-14
Payer: MEDICARE

## 2024-03-14 DIAGNOSIS — C61 CANCER OF PROSTATE (MULTI): ICD-10-CM

## 2024-03-14 PROCEDURE — 71260 CT THORAX DX C+: CPT | Performed by: RADIOLOGY

## 2024-03-14 PROCEDURE — 74177 CT ABD & PELVIS W/CONTRAST: CPT

## 2024-03-14 PROCEDURE — 2550000001 HC RX 255 CONTRASTS

## 2024-03-14 PROCEDURE — 78306 BONE IMAGING WHOLE BODY: CPT | Performed by: NUCLEAR MEDICINE

## 2024-03-14 PROCEDURE — 78306 BONE IMAGING WHOLE BODY: CPT

## 2024-03-14 PROCEDURE — A9503 TC99M MEDRONATE: HCPCS

## 2024-03-14 PROCEDURE — 74177 CT ABD & PELVIS W/CONTRAST: CPT | Performed by: RADIOLOGY

## 2024-03-14 PROCEDURE — 3430000001 HC RX 343 DIAGNOSTIC RADIOPHARMACEUTICALS

## 2024-03-14 RX ADMIN — IOHEXOL 78 ML: 350 INJECTION, SOLUTION INTRAVENOUS at 10:41

## 2024-03-14 RX ADMIN — TECHNETIUM TC 99M MEDRONATE 25.6 MILLICURIE: 25 INJECTION, POWDER, FOR SOLUTION INTRAVENOUS at 09:30

## 2024-03-16 LAB
ATRIAL RATE: 66 BPM
P AXIS: 67 DEGREES
P OFFSET: 206 MS
P ONSET: 149 MS
PR INTERVAL: 136 MS
Q ONSET: 217 MS
QRS COUNT: 11 BEATS
QRS DURATION: 88 MS
QT INTERVAL: 384 MS
QTC CALCULATION(BAZETT): 402 MS
QTC FREDERICIA: 396 MS
R AXIS: 83 DEGREES
T AXIS: 2 DEGREES
T OFFSET: 409 MS
VENTRICULAR RATE: 66 BPM

## 2024-03-19 ENCOUNTER — LAB (OUTPATIENT)
Dept: LAB | Facility: HOSPITAL | Age: 79
End: 2024-03-19
Payer: MEDICARE

## 2024-03-19 ENCOUNTER — OFFICE VISIT (OUTPATIENT)
Dept: HEMATOLOGY/ONCOLOGY | Facility: HOSPITAL | Age: 79
End: 2024-03-19
Payer: MEDICARE

## 2024-03-19 VITALS
RESPIRATION RATE: 18 BRPM | DIASTOLIC BLOOD PRESSURE: 78 MMHG | SYSTOLIC BLOOD PRESSURE: 167 MMHG | TEMPERATURE: 97 F | WEIGHT: 174.16 LBS | OXYGEN SATURATION: 100 % | BODY MASS INDEX: 26.48 KG/M2 | HEART RATE: 75 BPM

## 2024-03-19 DIAGNOSIS — C61 CANCER OF PROSTATE (MULTI): ICD-10-CM

## 2024-03-19 LAB
ALBUMIN SERPL BCP-MCNC: 4.4 G/DL (ref 3.4–5)
ALP SERPL-CCNC: 88 U/L (ref 33–136)
ALT SERPL W P-5'-P-CCNC: 16 U/L (ref 10–52)
ANION GAP SERPL CALC-SCNC: 12 MMOL/L (ref 10–20)
AST SERPL W P-5'-P-CCNC: 19 U/L (ref 9–39)
BASOPHILS # BLD AUTO: 0.03 X10*3/UL (ref 0–0.1)
BASOPHILS NFR BLD AUTO: 0.6 %
BILIRUB SERPL-MCNC: 0.5 MG/DL (ref 0–1.2)
BUN SERPL-MCNC: 18 MG/DL (ref 6–23)
CALCIUM SERPL-MCNC: 9.4 MG/DL (ref 8.6–10.3)
CHLORIDE SERPL-SCNC: 104 MMOL/L (ref 98–107)
CO2 SERPL-SCNC: 29 MMOL/L (ref 21–32)
CREAT SERPL-MCNC: 0.71 MG/DL (ref 0.5–1.3)
EGFRCR SERPLBLD CKD-EPI 2021: >90 ML/MIN/1.73M*2
EOSINOPHIL # BLD AUTO: 0.15 X10*3/UL (ref 0–0.4)
EOSINOPHIL NFR BLD AUTO: 2.8 %
ERYTHROCYTE [DISTWIDTH] IN BLOOD BY AUTOMATED COUNT: 14.3 % (ref 11.5–14.5)
GLUCOSE SERPL-MCNC: 90 MG/DL (ref 74–99)
HCT VFR BLD AUTO: 38.9 % (ref 41–52)
HGB BLD-MCNC: 12.9 G/DL (ref 13.5–17.5)
IMM GRANULOCYTES # BLD AUTO: 0.01 X10*3/UL (ref 0–0.5)
IMM GRANULOCYTES NFR BLD AUTO: 0.2 % (ref 0–0.9)
LYMPHOCYTES # BLD AUTO: 1.35 X10*3/UL (ref 0.8–3)
LYMPHOCYTES NFR BLD AUTO: 25.2 %
MCH RBC QN AUTO: 29.3 PG (ref 26–34)
MCHC RBC AUTO-ENTMCNC: 33.2 G/DL (ref 32–36)
MCV RBC AUTO: 88 FL (ref 80–100)
MONOCYTES # BLD AUTO: 0.6 X10*3/UL (ref 0.05–0.8)
MONOCYTES NFR BLD AUTO: 11.2 %
NEUTROPHILS # BLD AUTO: 3.21 X10*3/UL (ref 1.6–5.5)
NEUTROPHILS NFR BLD AUTO: 60 %
NRBC BLD-RTO: 0 /100 WBCS (ref 0–0)
PLATELET # BLD AUTO: 352 X10*3/UL (ref 150–450)
POTASSIUM SERPL-SCNC: 4.4 MMOL/L (ref 3.5–5.3)
PROT SERPL-MCNC: 7.3 G/DL (ref 6.4–8.2)
PSA SERPL-MCNC: 0.22 NG/ML
RBC # BLD AUTO: 4.4 X10*6/UL (ref 4.5–5.9)
SODIUM SERPL-SCNC: 141 MMOL/L (ref 136–145)
WBC # BLD AUTO: 5.4 X10*3/UL (ref 4.4–11.3)

## 2024-03-19 PROCEDURE — 99215 OFFICE O/P EST HI 40 MIN: CPT | Performed by: STUDENT IN AN ORGANIZED HEALTH CARE EDUCATION/TRAINING PROGRAM

## 2024-03-19 PROCEDURE — 85025 COMPLETE CBC W/AUTO DIFF WBC: CPT

## 2024-03-19 PROCEDURE — 84153 ASSAY OF PSA TOTAL: CPT | Mod: MUE

## 2024-03-19 PROCEDURE — 1036F TOBACCO NON-USER: CPT | Performed by: STUDENT IN AN ORGANIZED HEALTH CARE EDUCATION/TRAINING PROGRAM

## 2024-03-19 PROCEDURE — 84402 ASSAY OF FREE TESTOSTERONE: CPT

## 2024-03-19 PROCEDURE — 84153 ASSAY OF PSA TOTAL: CPT

## 2024-03-19 PROCEDURE — 1126F AMNT PAIN NOTED NONE PRSNT: CPT | Performed by: STUDENT IN AN ORGANIZED HEALTH CARE EDUCATION/TRAINING PROGRAM

## 2024-03-19 PROCEDURE — 80053 COMPREHEN METABOLIC PANEL: CPT

## 2024-03-19 PROCEDURE — 36415 COLL VENOUS BLD VENIPUNCTURE: CPT

## 2024-03-19 ASSESSMENT — ENCOUNTER SYMPTOMS
APPETITE CHANGE: 0
PSYCHIATRIC NEGATIVE: 1
HOT FLASHES: 1
COUGH: 0
NUMBNESS: 0
VOMITING: 0
FEVER: 0
ARTHRALGIAS: 0
HEADACHES: 0
MYALGIAS: 0
ABDOMINAL PAIN: 0
EXTREMITY WEAKNESS: 0
NAUSEA: 0
DIZZINESS: 0
EYES NEGATIVE: 1
CONSTIPATION: 0
BACK PAIN: 0
LEG SWELLING: 0
NECK PAIN: 0
CHILLS: 0
HEMATURIA: 0
HEMATOLOGIC/LYMPHATIC NEGATIVE: 1
UNEXPECTED WEIGHT CHANGE: 0
DYSURIA: 0
SHORTNESS OF BREATH: 0
DIFFICULTY URINATING: 0
FATIGUE: 0
DIARRHEA: 0

## 2024-03-19 ASSESSMENT — PAIN SCALES - GENERAL: PAINLEVEL: 0-NO PAIN

## 2024-03-19 NOTE — PROGRESS NOTES
Patient ID: Anderson Gautam is a 78 y.o. male.  Diagnosis:  metastatic high volume [facility]  MedOnc: Dr. Hurley  PCP: Poli Soriano    Patient Care Team:  Poli Soriano MD as PCP - General  Srinath Hurley MD as Consulting Physician (Hematology and Oncology)  KATY Rico-CNP as Nurse Practitioner (Hematology and Oncology)    Current Therapy: darolutamide + ADT    ONCOLOGIC HISTORY  PCP Poli Soriano, seen last in 2019, at the time PSA was normal per patient.   Last PSA was 4.2 at age 71.      12/4/22 ED for groin and flank pain. CT A&P negative except prostatomegaly.  12/30/22 Returned ED for pain. Bone metastases found on CT of chest. PSA drawn 1050.4.  1/6/23 Prostate bx in office with Dr. Ruiz  1/17/23 ADT started/ Daro ordered  2/11/23 Darolutamide started  2/14/23 C1 Docetaxel, ADT/Daro  2/21/23 sick visit- Prednisone started for myalgias  3/7/23 C2 Docetaxel- 15% dose reduction for fatigue, myalgia, neuropathy  3/28/23 C3 docetaxel, continued reduced dose  4/18/23  C4 docetaxel, continued reduced dose  5/9/23   C5 docetaxel, continued reduced dose  5/30/23  ADT + darolutamide. No cycle 6 due to AEs  7/5/23 Continue darolutamide, ADT today  8/16/23 Decrease darolutamide to 600mg in AM and 300mg in PM for leg pain  10/4/23 Continue darolutamide at 600mg in AM and 300 mg in PM  12/19/23: Continue darolutamide at reduced dose  3/19/24 - darolutamide (low dose 600+300), ADT       Oncology History   Cancer of prostate (CMS/HCC)   2/11/2023 -  Chemotherapy    Darolutamide, 84 Day Cycles     8/21/2023 Initial Diagnosis    Cancer of prostate (CMS/HCC)          Past Medical History: Anderson has a past medical history of Personal history of pneumonia (recurrent) (04/12/2017) and Prediabetes (05/11/2017).  Surgical History:  Anderson has no past surgical history on file.  Social History:  Anderson reports that he has never smoked. He has never been exposed to tobacco smoke. He has never used  smokeless tobacco. He reports that he does not drink alcohol and does not use drugs.  Family History:    Family History   Problem Relation Name Age of Onset    Emphysema Mother      COPD Mother      Cancer Mother      Emphysema Father      COPD Father       Family Oncology History:  Cancer-related family history includes Cancer in his mother.    SUBJECTIVE:    History of Present Illness:  Anderson Gautam is a 78 y.o. male who presents today for follow up of [facility]. Patient of Dr. Hurley currently on ADT and darolutamide.   Tolerating treatment well.   No Hospitalizations/ED visits.   Denies SOB and LATHAM.   He denies nausea, vomiting, constipation and diarrhea.   Denies abd pain. No LUTS.   Gets warm once in a while but tolerable.   On reduced dose darolutamide and tolerating this dose well.   Eating veggies and fruit.  Feels good  No pain.  Gaining weight from working out 3 times a week.  Bowling and walking and biking.   Belly fat, can't rid of.  Sleeping well.  Redness in ankles still.      Review of Systems   Constitutional:  Negative for appetite change, chills, fatigue, fever and unexpected weight change.   HENT:  Negative.     Eyes: Negative.    Respiratory:  Negative for cough and shortness of breath.    Cardiovascular:  Negative for chest pain and leg swelling.   Gastrointestinal:  Negative for abdominal pain, constipation, diarrhea, nausea and vomiting.   Endocrine: Positive for hot flashes.   Genitourinary:  Negative for difficulty urinating, dysuria and hematuria.    Musculoskeletal:  Negative for arthralgias, back pain, myalgias and neck pain.   Skin:  Negative for itching and rash.   Neurological:  Negative for dizziness, extremity weakness, headaches and numbness.   Hematological: Negative.    Psychiatric/Behavioral: Negative.       Allergies  Allergies   Allergen Reactions    Morphine Swelling      Medications  Current Outpatient Medications   Medication Instructions    ascorbic acid (VITAMIN C)  250 mg, oral, 2 times daily    calcium carbonate 400 mg, oral    cetirizine (ZYRTEC) 10 mg, oral, Daily    cholecalciferol (VITAMIN D-3) 400 Units, oral, Daily    darolutamide (Nubeqa) 300 mg tablet Take 2 tablets in the AM and 1 tablet in the PM. Take with or without food. Do not crush, chew, dissolve, or open. Swallow it whole.    fluticasone (Flonase) 50 mcg/actuation nasal spray 2 sprays, Each Nostril, Daily        OBJECTIVE:    VS / Pain:  There were no vitals taken for this visit.  BSA: There is no height or weight on file to calculate BSA.  Wt Readings from Last 5 Encounters:   03/08/24 79.4 kg (175 lb)   12/19/23 79.4 kg (175 lb 0.7 oz)   10/04/23 78.1 kg (172 lb 2.9 oz)   04/21/23 79.9 kg (176 lb 1 oz)   03/28/23 77.7 kg (171 lb 4.8 oz)      Pain Scale: 0    Physical Exam  Constitutional:       General: He is awake. He is not in acute distress.     Appearance: Normal appearance.   HENT:      Head: Normocephalic and atraumatic.      Nose: Nose normal.      Mouth/Throat:      Mouth: Mucous membranes are moist. No oral lesions.      Tongue: No lesions.   Eyes:      Pupils: Pupils are equal, round, and reactive to light.   Cardiovascular:      Rate and Rhythm: Normal rate and regular rhythm.      Heart sounds: Normal heart sounds, S1 normal and S2 normal. No murmur heard.  Pulmonary:      Effort: Pulmonary effort is normal.      Breath sounds: Normal breath sounds and air entry.   Abdominal:      General: Bowel sounds are normal. There is no distension.      Palpations: Abdomen is soft.      Tenderness: There is no abdominal tenderness. There is no guarding.   Musculoskeletal:      Cervical back: Full passive range of motion without pain.      Right lower leg: No edema.      Left lower leg: No edema.   Lymphadenopathy:      Head:      Right side of head: No submental, submandibular, tonsillar, preauricular, posterior auricular or occipital adenopathy.      Left side of head: No submental, submandibular,  tonsillar, preauricular, posterior auricular or occipital adenopathy.      Cervical: No cervical adenopathy.      Right cervical: No superficial cervical adenopathy.     Left cervical: No superficial cervical adenopathy.      Upper Body:      Right upper body: No supraclavicular adenopathy.      Left upper body: No supraclavicular adenopathy.   Skin:     General: Skin is warm and dry.      Capillary Refill: Capillary refill takes less than 2 seconds.      Findings: No rash.   Neurological:      General: No focal deficit present.      Mental Status: He is alert and oriented to person, place, and time.      Motor: Motor function is intact.      Gait: Gait is intact.   Psychiatric:         Attention and Perception: Attention normal.         Mood and Affect: Mood normal.         Speech: Speech normal.         Behavior: Behavior normal. Behavior is cooperative.         Thought Content: Thought content normal.         Cognition and Memory: Cognition and memory normal.         Judgment: Judgment normal.     Performance Status:  ECOG Score: 0- Fully active, able to carry on all pre-disease performance w/o restriction.  Karnofsky Score: 90 - Able to carry on normal activity; minor signs or symptoms of disease     Diagnostic Results   No results found for this or any previous visit (from the past 96 hour(s)).    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 Will try full dose darolutamide and see us back in 3 months for ADT  I saw and evaluated the patient. I personally obtained the key and critical portions of the history and physical exam or was physically present for  key and critical portions performed by the resident/fellow. I reviewed the resident/fellow's documentation and discussed the patient with the resident/fellow. I agree with the resident/fellow's medical decision making as documented in the note.   Srinath Hurley MD MSc FACP  [facility] Chair in Cancer Research   in Medicine [employer]  Director Clinical  Medical Oncology Research Program   [facility] [phone]  Office [phone]

## 2024-03-21 LAB — PSA SERPL DL<=0.01 NG/ML-MCNC: 0.21 NG/ML (ref 0–4)

## 2024-03-23 LAB
TESTOSTERONE FREE (CHAN): 0.9 PG/ML (ref 30–135)
TESTOSTERONE,TOTAL,LC-MS/MS: 10 NG/DL (ref 250–1100)

## 2024-05-31 ASSESSMENT — ENCOUNTER SYMPTOMS
DIFFICULTY URINATING: 0
NAUSEA: 0
HEADACHES: 0
SHORTNESS OF BREATH: 0
DYSURIA: 0
HEMATURIA: 0
DIARRHEA: 0
EXTREMITY WEAKNESS: 0
LEG SWELLING: 0
FEVER: 0
APPETITE CHANGE: 0
VOMITING: 0
CHILLS: 0
HEMATOLOGIC/LYMPHATIC NEGATIVE: 1
DIZZINESS: 0
CONSTIPATION: 0
MYALGIAS: 0
BACK PAIN: 0
ABDOMINAL PAIN: 0
NECK PAIN: 0
HOT FLASHES: 1
ARTHRALGIAS: 0
FATIGUE: 0
EYES NEGATIVE: 1
NUMBNESS: 0
COUGH: 0
UNEXPECTED WEIGHT CHANGE: 0

## 2024-05-31 NOTE — PROGRESS NOTES
Patient ID: Anderson Gautam is a 79 y.o. male.  Diagnosis:  metastatic high volume [facility] [place]  MedOnc: Dr. Hurley  PCP: Poli Soriano    Patient Care Team:  Poli Soriano MD as PCP - General  Srinath Hurley MD as Consulting Physician (Hematology and Oncology)  KATY Rico-CNP as Nurse Practitioner (Hematology and Oncology)    Current Therapy: darolutamide + ADT    ONCOLOGIC HISTORY  PCP Poli Soriano, seen last in 2019, at the time PSA was normal per patient.   Last PSA was 4.2 at age 71.      12/4/22 ED for groin and flank pain. CT A&P negative except prostatomegaly.  12/30/22 Returned ED for pain. Bone metastases found on CT of chest. PSA drawn 1050.4.  1/6/23 Prostate bx in office with Dr. Ruiz  1/17/23 ADT started/ Daro ordered  2/11/23 Darolutamide started  2/14/23 C1 Docetaxel, ADT/Daro  2/21/23 sick visit- Prednisone started for myalgias  3/7/23 C2 Docetaxel- 15% dose reduction for fatigue, myalgia, neuropathy  3/28/23 C3 docetaxel, continued reduced dose  4/18/23  C4 docetaxel, continued reduced dose  5/9/23   C5 docetaxel, continued reduced dose  5/30/23  ADT + darolutamide. No cycle 6 due to AEs  7/5/23 Continue darolutamide, ADT today  8/16/23 Decrease darolutamide to 600mg in AM and 300mg in PM for leg pain  10/4/23 Continue darolutamide at 600mg in AM and 300 mg in PM  12/19/23: Continue darolutamide at reduced dose  3/19/24: darolutamide (low dose 600+300), ADT   6/4/24: Continue darolutamide 600mg BID, ADT today    Oncology History   Cancer of prostate (Multi)   2/11/2023 -  Chemotherapy    Darolutamide, 84 Day Cycles     8/21/2023 Initial Diagnosis    Cancer of prostate (CMS/HCC)          Past Medical History: Anderson has a past medical history of Personal history of pneumonia (recurrent) (04/12/2017) and Prediabetes (05/11/2017).  Surgical History:  Anderson has no past surgical history on file.  Social History:  Anderson reports that he has never smoked. He has never been  exposed to tobacco smoke. He has never used smokeless tobacco. He reports that he does not drink alcohol and does not use drugs.  Family History:    Family History   Problem Relation Name Age of Onset    Emphysema Mother      COPD Mother      Cancer Mother      Emphysema Father      COPD Father       Family Oncology History:  Cancer-related family history includes Cancer in his mother.    SUBJECTIVE:    History of Present Illness:  Anderson Gautam is a 79 y.o. male who presents today for follow up of [facility]. Patient of Dr. Hurley currently on ADT and darolutamide. Continues to do well and tolerate treatment. At his last visit with Dr. Hurley his darolutamide dose was increased back to full strength. He noticed an increase in chest and belly fat, breast tenderness, and hot flashes. He is managing these side effects. He notices hot flashes 30 mins after taking darolutamide BID, no other times throughout the day. He continues to eat healthy and exercise. Bowling good. No longer lifting weights but doing stretch/resistance bands. He has some sleep disturbance, sleeps only about 4 hours at a time. Energy is good. Appetite is good. He denies pain. He denies nausea, vomiting, constipation and diarrhea. No LUTS.     Review of Systems   Constitutional:  Negative for appetite change, chills, fatigue, fever and unexpected weight change.   HENT:  Negative.     Eyes: Negative.    Respiratory:  Negative for cough and shortness of breath.    Cardiovascular:  Negative for chest pain and leg swelling.   Gastrointestinal:  Negative for abdominal pain, constipation, diarrhea, nausea and vomiting.   Endocrine: Positive for hot flashes.   Genitourinary:  Negative for difficulty urinating, dysuria and hematuria.    Musculoskeletal:  Negative for arthralgias, back pain, myalgias and neck pain.   Skin:  Negative for itching and rash.   Neurological:  Negative for dizziness, extremity weakness, headaches and numbness.   Hematological:  Negative.    Psychiatric/Behavioral:  Positive for sleep disturbance.      Allergies  Allergies   Allergen Reactions    Morphine Swelling      Medications  Current Outpatient Medications   Medication Instructions    ascorbic acid (VITAMIN C) 250 mg, oral, 2 times daily    calcium carbonate 400 mg, oral    cetirizine (ZYRTEC) 10 mg, oral, Daily    cholecalciferol (VITAMIN D-3) 400 Units, oral, Daily    darolutamide (Nubeqa) 300 mg tablet Take 2 tablets in the AM and 1 tablet in the PM. Take with or without food. Do not crush, chew, dissolve, or open. Swallow it whole.    fluticasone (Flonase) 50 mcg/actuation nasal spray 2 sprays, Each Nostril, Daily        OBJECTIVE:    VS / Pain:  /74   Pulse 66   Temp 36.4 °C (97.5 °F) (Core)   Resp 20   Wt 80.4 kg (177 lb 4 oz)   SpO2 100%   BMI 26.95 kg/m²   BSA: 1.96 meters squared  Wt Readings from Last 5 Encounters:   06/04/24 80.4 kg (177 lb 4 oz)   03/19/24 79 kg (174 lb 2.6 oz)   03/08/24 79.4 kg (175 lb)   12/19/23 79.4 kg (175 lb 0.7 oz)   10/04/23 78.1 kg (172 lb 2.9 oz)     Physical Exam  Constitutional:       General: He is not in acute distress.     Appearance: Normal appearance. He is not toxic-appearing.   HENT:      Head: Normocephalic and atraumatic.      Mouth/Throat:      Mouth: Mucous membranes are moist.      Pharynx: Oropharynx is clear.   Eyes:      Pupils: Pupils are equal, round, and reactive to light.   Pulmonary:      Effort: Pulmonary effort is normal.   Musculoskeletal:         General: Normal range of motion.      Cervical back: Normal range of motion.      Right lower leg: No edema.      Left lower leg: No edema.   Skin:     General: Skin is warm and dry.      Findings: No rash.   Neurological:      General: No focal deficit present.      Mental Status: He is alert and oriented to person, place, and time.      Motor: No weakness.   Psychiatric:         Mood and Affect: Mood normal.         Behavior: Behavior normal.         Thought  Content: Thought content normal.         Judgment: Judgment normal.     Performance Status:  ECOG Score: 0- Fully active, able to carry on all pre-disease performance w/o restriction.  Karnofsky Score: 90 - Able to carry on normal activity; minor signs or symptoms of disease     Diagnostic Results   Results for orders placed or performed in visit on 06/03/24 (from the past 96 hour(s))   Comprehensive Metabolic Panel   Result Value Ref Range    Glucose 102 (H) 74 - 99 mg/dL    Sodium 139 136 - 145 mmol/L    Potassium 4.2 3.5 - 5.3 mmol/L    Chloride 106 98 - 107 mmol/L    Bicarbonate 26 21 - 32 mmol/L    Anion Gap 11 10 - 20 mmol/L    Urea Nitrogen 24 (H) 6 - 23 mg/dL    Creatinine 0.83 0.50 - 1.30 mg/dL    eGFR 90 >60 mL/min/1.73m*2    Calcium 9.4 8.6 - 10.3 mg/dL    Albumin 4.2 3.4 - 5.0 g/dL    Alkaline Phosphatase 86 33 - 136 U/L    Total Protein 6.5 6.4 - 8.2 g/dL    AST 25 9 - 39 U/L    Bilirubin, Total 0.5 0.0 - 1.2 mg/dL    ALT 21 10 - 52 U/L   CBC   Result Value Ref Range    WBC 5.5 4.4 - 11.3 x10*3/uL    nRBC 0.0 0.0 - 0.0 /100 WBCs    RBC 4.49 (L) 4.50 - 5.90 x10*6/uL    Hemoglobin 13.9 13.5 - 17.5 g/dL    Hematocrit 40.9 (L) 41.0 - 52.0 %    MCV 91 80 - 100 fL    MCH 31.0 26.0 - 34.0 pg    MCHC 34.0 32.0 - 36.0 g/dL    RDW 14.2 11.5 - 14.5 %    Platelets 231 150 - 450 x10*3/uL   Prostate Specific Antigen   Result Value Ref Range    Prostate Specific AG 0.12 <=4.00 ng/mL     Lab Results   Component Value Date    PSA 0.12 06/03/2024    PSA 0.22 03/19/2024    PSA 0.21 12/18/2023     Lab Results   Component Value Date    TESTOTOTMS 10 (L) 03/19/2024     Lab Results   Component Value Date    TESTOSTERONE <60 (L) 05/09/2023     3/14/24 Imaging     IMPRESSION:  1. Significant decrease in the extent of multifocal radiotracer  uptake throughout the axial and appendicular skeleton with few  persistent sites of mild increased activity, consistent with treated  osseous metastatic disease.  2. No evidence of new or  worsening osseous metastatic disease.    IMPRESSION:  Grossly stable extensive skeletal metastases no definite pathologic  fracture.      No suspicious pulmonary nodule or mass.      No adenopathy in the chest, abdomen, and pelvis.      No evidence of metastasis to the solid organs of the abdomen.     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 Went back to full dose darolutamide at last visit in March. Tolerating treatment well. Hot flashes, breast tenderness, and chest/belly weight gain. No concerning symptoms. His PSA continues to decline and his CMP and CBC are unremarkable.     No matching staging information was found for the patient.  Anderson was seen today for follow-up.  Diagnoses and all orders for this visit:  Cancer of prostate (Multi) (Primary)  -     Clinic Appointment Request (at 9:30am); CHUCK PEGUERO    Treatment Plan:  Darolutamide, 84 Day Cycles  Leuprolide Acetate, Every 24 Weeks  High Volume Prostate Cancer   - ADT Q 6 months- due today  - Continue Darolutamide to 600mg BID - new script sent to CoverMyMeds to match dosing  - Continue Vitamin D and Ca  - Continue exercise     FUV in 3 months for tox check, labs prior.     Patient verbalizes understanding of above plan. Time provided for patient's questions. All questions answered to patient's satisfaction in office. Patient instructed to reach out for any new concerning issues at [phone].    Chuck Peguero MSN, APRN, A-GNP-C, OCN   Oncology   [facility] [address] [address]   Epic Secure Chat   Phone: [phone]  [email or web address]

## 2024-06-03 ENCOUNTER — APPOINTMENT (OUTPATIENT)
Dept: HEMATOLOGY/ONCOLOGY | Facility: HOSPITAL | Age: 79
End: 2024-06-03
Payer: MEDICARE

## 2024-06-03 ENCOUNTER — LAB (OUTPATIENT)
Dept: LAB | Facility: CLINIC | Age: 79
End: 2024-06-03
Payer: MEDICARE

## 2024-06-03 DIAGNOSIS — C61 CANCER OF PROSTATE (MULTI): ICD-10-CM

## 2024-06-03 LAB
ALBUMIN SERPL BCP-MCNC: 4.2 G/DL (ref 3.4–5)
ALP SERPL-CCNC: 86 U/L (ref 33–136)
ALT SERPL W P-5'-P-CCNC: 21 U/L (ref 10–52)
ANION GAP SERPL CALC-SCNC: 11 MMOL/L (ref 10–20)
AST SERPL W P-5'-P-CCNC: 25 U/L (ref 9–39)
BILIRUB SERPL-MCNC: 0.5 MG/DL (ref 0–1.2)
BUN SERPL-MCNC: 24 MG/DL (ref 6–23)
CALCIUM SERPL-MCNC: 9.4 MG/DL (ref 8.6–10.3)
CHLORIDE SERPL-SCNC: 106 MMOL/L (ref 98–107)
CO2 SERPL-SCNC: 26 MMOL/L (ref 21–32)
CREAT SERPL-MCNC: 0.83 MG/DL (ref 0.5–1.3)
EGFRCR SERPLBLD CKD-EPI 2021: 90 ML/MIN/1.73M*2
ERYTHROCYTE [DISTWIDTH] IN BLOOD BY AUTOMATED COUNT: 14.2 % (ref 11.5–14.5)
GLUCOSE SERPL-MCNC: 102 MG/DL (ref 74–99)
HCT VFR BLD AUTO: 40.9 % (ref 41–52)
HGB BLD-MCNC: 13.9 G/DL (ref 13.5–17.5)
MCH RBC QN AUTO: 31 PG (ref 26–34)
MCHC RBC AUTO-ENTMCNC: 34 G/DL (ref 32–36)
MCV RBC AUTO: 91 FL (ref 80–100)
NRBC BLD-RTO: 0 /100 WBCS (ref 0–0)
PLATELET # BLD AUTO: 231 X10*3/UL (ref 150–450)
POTASSIUM SERPL-SCNC: 4.2 MMOL/L (ref 3.5–5.3)
PROT SERPL-MCNC: 6.5 G/DL (ref 6.4–8.2)
PSA SERPL-MCNC: 0.12 NG/ML
RBC # BLD AUTO: 4.49 X10*6/UL (ref 4.5–5.9)
SODIUM SERPL-SCNC: 139 MMOL/L (ref 136–145)
WBC # BLD AUTO: 5.5 X10*3/UL (ref 4.4–11.3)

## 2024-06-03 PROCEDURE — 84153 ASSAY OF PSA TOTAL: CPT | Mod: PARLAB | Performed by: STUDENT IN AN ORGANIZED HEALTH CARE EDUCATION/TRAINING PROGRAM

## 2024-06-03 PROCEDURE — 84270 ASSAY OF SEX HORMONE GLOBUL: CPT

## 2024-06-03 PROCEDURE — 85027 COMPLETE CBC AUTOMATED: CPT | Performed by: STUDENT IN AN ORGANIZED HEALTH CARE EDUCATION/TRAINING PROGRAM

## 2024-06-03 PROCEDURE — 80053 COMPREHEN METABOLIC PANEL: CPT | Performed by: STUDENT IN AN ORGANIZED HEALTH CARE EDUCATION/TRAINING PROGRAM

## 2024-06-03 PROCEDURE — 84402 ASSAY OF FREE TESTOSTERONE: CPT

## 2024-06-03 PROCEDURE — 36415 COLL VENOUS BLD VENIPUNCTURE: CPT

## 2024-06-04 ENCOUNTER — INFUSION (OUTPATIENT)
Dept: HEMATOLOGY/ONCOLOGY | Facility: HOSPITAL | Age: 79
End: 2024-06-04
Payer: MEDICARE

## 2024-06-04 ENCOUNTER — OFFICE VISIT (OUTPATIENT)
Dept: HEMATOLOGY/ONCOLOGY | Facility: HOSPITAL | Age: 79
End: 2024-06-04
Payer: MEDICARE

## 2024-06-04 VITALS
SYSTOLIC BLOOD PRESSURE: 136 MMHG | RESPIRATION RATE: 20 BRPM | DIASTOLIC BLOOD PRESSURE: 74 MMHG | OXYGEN SATURATION: 100 % | TEMPERATURE: 97.5 F | BODY MASS INDEX: 26.95 KG/M2 | HEART RATE: 66 BPM | WEIGHT: 177.25 LBS

## 2024-06-04 DIAGNOSIS — C61 CANCER OF PROSTATE (MULTI): ICD-10-CM

## 2024-06-04 DIAGNOSIS — C61 CANCER OF PROSTATE (MULTI): Primary | ICD-10-CM

## 2024-06-04 PROCEDURE — 99214 OFFICE O/P EST MOD 30 MIN: CPT

## 2024-06-04 PROCEDURE — 1125F AMNT PAIN NOTED PAIN PRSNT: CPT

## 2024-06-04 PROCEDURE — 2500000004 HC RX 250 GENERAL PHARMACY W/ HCPCS (ALT 636 FOR OP/ED): Mod: JZ

## 2024-06-04 PROCEDURE — 96402 CHEMO HORMON ANTINEOPL SQ/IM: CPT

## 2024-06-04 PROCEDURE — 1036F TOBACCO NON-USER: CPT

## 2024-06-04 PROCEDURE — 1160F RVW MEDS BY RX/DR IN RCRD: CPT

## 2024-06-04 PROCEDURE — 1159F MED LIST DOCD IN RCRD: CPT

## 2024-06-04 RX ORDER — ALBUTEROL SULFATE 0.83 MG/ML
3 SOLUTION RESPIRATORY (INHALATION) AS NEEDED
OUTPATIENT
Start: 2024-08-26

## 2024-06-04 RX ORDER — FAMOTIDINE 10 MG/ML
20 INJECTION INTRAVENOUS ONCE AS NEEDED
OUTPATIENT
Start: 2024-08-26

## 2024-06-04 RX ORDER — EPINEPHRINE 0.3 MG/.3ML
0.3 INJECTION SUBCUTANEOUS EVERY 5 MIN PRN
OUTPATIENT
Start: 2024-08-26

## 2024-06-04 RX ORDER — DIPHENHYDRAMINE HYDROCHLORIDE 50 MG/ML
50 INJECTION INTRAMUSCULAR; INTRAVENOUS AS NEEDED
OUTPATIENT
Start: 2024-08-26

## 2024-06-04 RX ADMIN — LEUPROLIDE ACETATE 45 MG: 45 INJECTION, SUSPENSION, EXTENDED RELEASE SUBCUTANEOUS at 10:18

## 2024-06-04 ASSESSMENT — PAIN SCALES - GENERAL: PAINLEVEL: 1

## 2024-06-04 ASSESSMENT — ENCOUNTER SYMPTOMS: SLEEP DISTURBANCE: 1

## 2024-06-04 NOTE — PATIENT INSTRUCTIONS
PSA was 0.12 - continues to trend down     Continue darolutamide 600 mg (2 tabs) in the AM and 600 mg (2 tabs) in the PM.     Continue Vitamin D and Calcium     Belly and chest fat, breast tenderness, and hot flashes are common and excepted side effects of low testosterone. Continue to eat a healthy diet and exercise.     We will see you in 3 months with labs prior.

## 2024-06-04 NOTE — PROGRESS NOTES
Patient came for his eligard. He tolerated his injection without any issue.He was discharged stable.

## 2024-06-06 LAB — TESTOSTERONE,TOTAL,LC-MS/MS: 13 NG/DL (ref 250–1100)

## 2024-06-07 LAB
TESTOSTERONE FREE (CHAN): 1 PG/ML (ref 30–135)
TESTOSTERONE,TOTAL,LC-MS/MS: 13 NG/DL (ref 250–1100)

## 2024-08-30 ENCOUNTER — LAB (OUTPATIENT)
Dept: LAB | Facility: CLINIC | Age: 79
End: 2024-08-30
Payer: MEDICARE

## 2024-08-30 DIAGNOSIS — C61 CANCER OF PROSTATE (MULTI): ICD-10-CM

## 2024-08-30 LAB
ALBUMIN SERPL BCP-MCNC: 4.4 G/DL (ref 3.4–5)
ALP SERPL-CCNC: 89 U/L (ref 33–136)
ALT SERPL W P-5'-P-CCNC: 24 U/L (ref 10–52)
ANION GAP SERPL CALC-SCNC: 9 MMOL/L (ref 10–20)
AST SERPL W P-5'-P-CCNC: 26 U/L (ref 9–39)
BASOPHILS # BLD AUTO: 0.03 X10*3/UL (ref 0–0.1)
BASOPHILS NFR BLD AUTO: 0.5 %
BILIRUB SERPL-MCNC: 0.5 MG/DL (ref 0–1.2)
BUN SERPL-MCNC: 18 MG/DL (ref 6–23)
CALCIUM SERPL-MCNC: 9.7 MG/DL (ref 8.6–10.3)
CHLORIDE SERPL-SCNC: 103 MMOL/L (ref 98–107)
CO2 SERPL-SCNC: 29 MMOL/L (ref 21–32)
CREAT SERPL-MCNC: 0.83 MG/DL (ref 0.5–1.3)
EGFRCR SERPLBLD CKD-EPI 2021: 90 ML/MIN/1.73M*2
EOSINOPHIL # BLD AUTO: 0.26 X10*3/UL (ref 0–0.4)
EOSINOPHIL NFR BLD AUTO: 4.6 %
ERYTHROCYTE [DISTWIDTH] IN BLOOD BY AUTOMATED COUNT: 13.6 % (ref 11.5–14.5)
GLUCOSE SERPL-MCNC: 106 MG/DL (ref 74–99)
HCT VFR BLD AUTO: 40 % (ref 41–52)
HGB BLD-MCNC: 13.5 G/DL (ref 13.5–17.5)
IMM GRANULOCYTES # BLD AUTO: 0.02 X10*3/UL (ref 0–0.5)
IMM GRANULOCYTES NFR BLD AUTO: 0.4 % (ref 0–0.9)
LYMPHOCYTES # BLD AUTO: 1.52 X10*3/UL (ref 0.8–3)
LYMPHOCYTES NFR BLD AUTO: 27 %
MCH RBC QN AUTO: 30.8 PG (ref 26–34)
MCHC RBC AUTO-ENTMCNC: 33.8 G/DL (ref 32–36)
MCV RBC AUTO: 91 FL (ref 80–100)
MONOCYTES # BLD AUTO: 0.7 X10*3/UL (ref 0.05–0.8)
MONOCYTES NFR BLD AUTO: 12.4 %
NEUTROPHILS # BLD AUTO: 3.11 X10*3/UL (ref 1.6–5.5)
NEUTROPHILS NFR BLD AUTO: 55.1 %
NRBC BLD-RTO: 0 /100 WBCS (ref 0–0)
PLATELET # BLD AUTO: 240 X10*3/UL (ref 150–450)
POTASSIUM SERPL-SCNC: 4.5 MMOL/L (ref 3.5–5.3)
PROT SERPL-MCNC: 6.6 G/DL (ref 6.4–8.2)
PSA SERPL-MCNC: 0.11 NG/ML
RBC # BLD AUTO: 4.39 X10*6/UL (ref 4.5–5.9)
SODIUM SERPL-SCNC: 136 MMOL/L (ref 136–145)
WBC # BLD AUTO: 5.6 X10*3/UL (ref 4.4–11.3)

## 2024-08-30 PROCEDURE — 82565 ASSAY OF CREATININE: CPT

## 2024-08-30 PROCEDURE — 84153 ASSAY OF PSA TOTAL: CPT | Mod: PARLAB

## 2024-08-30 PROCEDURE — 84402 ASSAY OF FREE TESTOSTERONE: CPT

## 2024-08-30 PROCEDURE — 85025 COMPLETE CBC W/AUTO DIFF WBC: CPT

## 2024-08-30 PROCEDURE — 36415 COLL VENOUS BLD VENIPUNCTURE: CPT

## 2024-09-03 ENCOUNTER — TELEPHONE (OUTPATIENT)
Dept: HEMATOLOGY/ONCOLOGY | Facility: HOSPITAL | Age: 79
End: 2024-09-03

## 2024-09-03 ENCOUNTER — OFFICE VISIT (OUTPATIENT)
Dept: HEMATOLOGY/ONCOLOGY | Facility: HOSPITAL | Age: 79
End: 2024-09-03
Payer: MEDICARE

## 2024-09-03 VITALS
SYSTOLIC BLOOD PRESSURE: 147 MMHG | OXYGEN SATURATION: 99 % | RESPIRATION RATE: 20 BRPM | TEMPERATURE: 96.8 F | WEIGHT: 179.68 LBS | DIASTOLIC BLOOD PRESSURE: 66 MMHG | BODY MASS INDEX: 27.32 KG/M2 | HEART RATE: 76 BPM

## 2024-09-03 DIAGNOSIS — C61 CANCER OF PROSTATE (MULTI): Primary | ICD-10-CM

## 2024-09-03 DIAGNOSIS — N62 GYNECOMASTIA: ICD-10-CM

## 2024-09-03 DIAGNOSIS — N64.4 BREAST PAIN: ICD-10-CM

## 2024-09-03 PROCEDURE — 1036F TOBACCO NON-USER: CPT | Performed by: NURSE PRACTITIONER

## 2024-09-03 PROCEDURE — 99215 OFFICE O/P EST HI 40 MIN: CPT | Performed by: NURSE PRACTITIONER

## 2024-09-03 PROCEDURE — 1126F AMNT PAIN NOTED NONE PRSNT: CPT | Performed by: NURSE PRACTITIONER

## 2024-09-03 ASSESSMENT — PAIN SCALES - GENERAL: PAINLEVEL: 0-NO PAIN

## 2024-09-03 NOTE — PROGRESS NOTES
Patient ID: Anderson Gautam is a 79 y.o. male.  Attending Physician: Dr. Srinath Hurley  Cancer Diagnosis: Cancer Staging   No matching staging information was found for the patient.     Current Therapy: ADT (Eligard 45 mg every 24 weeks)  Docetaxel (completed 6 cycles)  Darolutamide 600 mg BID    Genetics:   Guardant 360  AT  TP53  TMB 6.7  MSI-H not detected    Subjective      Cancer History:  Oncology History   Cancer of prostate (Multi)   2/11/2023 -  Chemotherapy    Darolutamide, 84 Day Cycles     8/21/2023 Initial Diagnosis    Cancer of prostate (CMS/HCC)       12/4/22 ED for groin and flank pain. CT A&P negative except prostatomegaly.  12/30/22 Returned ED for pain. Bone metastases found on CT of chest. PSA drawn and found to be 1050.4.  1/6/23 Prostate bx in office with Dr. Ruiz  1/17/23 ADT started/ Daro ordered  2/11/23 Darolutamide started  2/14/23 C1 Docetaxel, ADT/Daro  2/21/23 sick visit- Prednisone started for myalgias  3/7/23 C2 Docetaxel- 15% dose reduction for fatigue, myalgia, neuropathy  3/28/23 C3 docetaxel, continued reduced dose  4/18/23  C4 docetaxel, continued reduced dose  5/9/23   C5 docetaxel, continued reduced dose  5/30/23  ADT + darolutamide. No cycle 6 due to AEs  7/5/23 Continue darolutamide, ADT today  8/16/23 Decrease darolutamide to 600mg in AM and 300mg in PM for leg pain  10/4/23 Continue darolutamide at 600mg in AM and 300 mg in PM  12/19/23: Continue darolutamide at reduced dose  3/19/24: darolutamide (low dose 600+300), ADT   6/4/24: Continue darolutamide 600mg BID, ADT  9/3/24: PSA 0.11    Interval History:  Mr. Gautam presents in follow up today. He reports a few months of bilateral breast tenderness without masses. This hurts when he is active or runs into something. No bruising or other abnormalities noted. He otherwise continues to have hot flashes and decreased energy, is working out regularly. No other new or concerning symptoms. The remainder of his ROS is  otherwise negative.    HPI    Objective    BSA: 1.98 meters squared  /66   Pulse 76   Temp 36 °C (96.8 °F) (Core)   Resp 20   Wt 81.5 kg (179 lb 10.8 oz)   SpO2 99%   BMI 27.32 kg/m²     Physical Exam  General: alert, well-dressed in NAD. Speech is fluent and coherent, words clear. Good insight. Oriented x4  Skin: warm, dry, and pink without cyanosis or nail clubbing. No rash, petechiae, or ecchymoses.  HEENT: Normocephalic atraumatic. Sclera white, conjunctiva pink. EOMs intact. Hearing intact to spoken voice. No visible lesions  Respiratory: Chest expansion symmetric. No audible wheeze. Unlabored breathing.  CV: Good color   Psych: engaged, polite, appropriate conversation and eye contact.      Current Medications:    Current Outpatient Medications:     ascorbic acid (Vitamin C) 250 mg tablet, Take 1 tablet (250 mg) by mouth 2 times a day., Disp: , Rfl:     calcium citrate/vitamin D3 (CALCIUM CITRATE + D ORAL), Take 500 mg by mouth once daily., Disp: , Rfl:     darolutamide (Nubeqa) 300 mg tablet, Take 2 tablets (600 mg total) by mouth 2 times a day., Disp: 120 tablet, Rfl: 3     Most Recent Labs:  Results for orders placed or performed in visit on 08/30/24   CBC and Auto Differential   Result Value Ref Range    WBC 5.6 4.4 - 11.3 x10*3/uL    nRBC 0.0 0.0 - 0.0 /100 WBCs    RBC 4.39 (L) 4.50 - 5.90 x10*6/uL    Hemoglobin 13.5 13.5 - 17.5 g/dL    Hematocrit 40.0 (L) 41.0 - 52.0 %    MCV 91 80 - 100 fL    MCH 30.8 26.0 - 34.0 pg    MCHC 33.8 32.0 - 36.0 g/dL    RDW 13.6 11.5 - 14.5 %    Platelets 240 150 - 450 x10*3/uL    Neutrophils % 55.1 40.0 - 80.0 %    Immature Granulocytes %, Automated 0.4 0.0 - 0.9 %    Lymphocytes % 27.0 13.0 - 44.0 %    Monocytes % 12.4 2.0 - 10.0 %    Eosinophils % 4.6 0.0 - 6.0 %    Basophils % 0.5 0.0 - 2.0 %    Neutrophils Absolute 3.11 1.60 - 5.50 x10*3/uL    Immature Granulocytes Absolute, Automated 0.02 0.00 - 0.50 x10*3/uL    Lymphocytes Absolute 1.52 0.80 - 3.00  x10*3/uL    Monocytes Absolute 0.70 0.05 - 0.80 x10*3/uL    Eosinophils Absolute 0.26 0.00 - 0.40 x10*3/uL    Basophils Absolute 0.03 0.00 - 0.10 x10*3/uL   Comprehensive Metabolic Panel   Result Value Ref Range    Glucose 106 (H) 74 - 99 mg/dL    Sodium 136 136 - 145 mmol/L    Potassium 4.5 3.5 - 5.3 mmol/L    Chloride 103 98 - 107 mmol/L    Bicarbonate 29 21 - 32 mmol/L    Anion Gap 9 (L) 10 - 20 mmol/L    Urea Nitrogen 18 6 - 23 mg/dL    Creatinine 0.83 0.50 - 1.30 mg/dL    eGFR 90 >60 mL/min/1.73m*2    Calcium 9.7 8.6 - 10.3 mg/dL    Albumin 4.4 3.4 - 5.0 g/dL    Alkaline Phosphatase 89 33 - 136 U/L    Total Protein 6.6 6.4 - 8.2 g/dL    AST 26 9 - 39 U/L    Bilirubin, Total 0.5 0.0 - 1.2 mg/dL    ALT 24 10 - 52 U/L   Prostate Specific Antigen   Result Value Ref Range    Prostate Specific AG 0.11 <=4.00 ng/mL      Lab Results   Component Value Date    PSA 0.11 08/30/2024    PSA 0.12 06/03/2024    PSA 0.22 03/19/2024        Performance Status:  ECOG Score: 0- Fully active, able to carry on all pre-disease performance w/o restriction.  Karnofsky Score: 100 - Fully active, able to carry on all pre-disease performed without restriction      Assessment/Plan   Anderson Gautam is a 79 y.o. 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 He was initially dose reduced on daro but went to full dose 3/2023 and has been tolerating well.    He has what sounds like new gynecomastia, which is visible, without masses noted. He is agreeable to a mammogram. This was scheduled in September.    # HV mCSPC  - Continue ADT, next due November  - Continue darolutamide 2 BID    # Bone Health  - Continue calcium and vitamin D supplementation  - Continue regular, weight-bearing physical activity  - Last DEXA unknown, Consider DEXA by 1/2025    # Health Maintenance  - Continue with PCP and other healthcare providers  - Advised to continue exercise, heart-healthy diet    RTC  November for next scheduled injection    Total time spent on this encounter was 45 minutes, which included preparation, direct time with patient, documentation, and care coordination on the day of visit.    Marybeth Duenas, MSN, APRN, AGNP-C, AOCNP  Associate Nurse Practitioner  [facility] [place], [facility]

## 2024-09-03 NOTE — TELEPHONE ENCOUNTER
Received message from DELORES Rueda regarding wrong filling of darolutamide from outside specialty pharmacy.    Pharmacy contacted and noted working off old prescription. Informed them that they should have received new prescription for increased dose of darolutamide.    Wife made aware to contact pharmacy and ensure they send appropriate script for next fill either today or tomorrow.    Message forwarded to treatment team.    Nash Luciano, PharmD, CSP  Clinical Pharm Specialist -  Oncology  [facility]  Phone #: [phone]  Fax #: [phone]  Email: [email or web address]

## 2024-09-06 LAB
TESTOSTERONE FREE (CHAN): 0.7 PG/ML (ref 30–135)
TESTOSTERONE,TOTAL,LC-MS/MS: 8 NG/DL (ref 250–1100)

## 2024-09-17 ENCOUNTER — APPOINTMENT (OUTPATIENT)
Dept: RADIOLOGY | Facility: CLINIC | Age: 79
End: 2024-09-17
Payer: MEDICARE

## 2024-09-30 DIAGNOSIS — C61 CANCER OF PROSTATE (MULTI): ICD-10-CM

## 2024-11-06 ENCOUNTER — SPECIALTY PHARMACY (OUTPATIENT)
Dept: PHARMACY | Facility: CLINIC | Age: 79
End: 2024-11-06

## 2024-11-06 DIAGNOSIS — C61 CANCER OF PROSTATE (MULTI): Primary | ICD-10-CM

## 2024-11-06 NOTE — PROGRESS NOTES
Hello,    I received a fax from [employer] and they are requesting a new script be sent in for this patient's Nubeqa.     Can you please send a new one to them if appropriate?    Thanks,  Kenia

## 2024-11-07 ENCOUNTER — HOSPITAL ENCOUNTER (OUTPATIENT)
Dept: RADIOLOGY | Facility: CLINIC | Age: 79
Discharge: HOME | End: 2024-11-07
Payer: MEDICARE

## 2024-11-07 VITALS — WEIGHT: 179.68 LBS | HEIGHT: 68 IN | BODY MASS INDEX: 27.23 KG/M2

## 2024-11-07 DIAGNOSIS — N64.4 BREAST PAIN: ICD-10-CM

## 2024-11-07 DIAGNOSIS — N62 GYNECOMASTIA: ICD-10-CM

## 2024-11-07 DIAGNOSIS — C61 CANCER OF PROSTATE (MULTI): ICD-10-CM

## 2024-11-07 PROCEDURE — 76642 ULTRASOUND BREAST LIMITED: CPT | Mod: 50

## 2024-11-07 PROCEDURE — 77062 BREAST TOMOSYNTHESIS BI: CPT

## 2024-11-07 PROCEDURE — 76642 ULTRASOUND BREAST LIMITED: CPT | Performed by: RADIOLOGY

## 2024-11-07 PROCEDURE — 77066 DX MAMMO INCL CAD BI: CPT | Performed by: RADIOLOGY

## 2024-11-07 PROCEDURE — G0279 TOMOSYNTHESIS, MAMMO: HCPCS | Performed by: RADIOLOGY

## 2024-11-07 PROCEDURE — 76981 USE PARENCHYMA: CPT | Mod: 50

## 2024-11-08 NOTE — PROGRESS NOTES
New order for darolutamide sent to [facility].    Nash Luciano, PharmD, CSP  Clinical Pharm Specialist -  Oncology  [facility]  Phone #: [phone]  Fax #: [phone]  Email: [email or web address]

## 2024-11-12 ENCOUNTER — ANCILLARY PROCEDURE (OUTPATIENT)
Dept: URGENT CARE | Age: 79
End: 2024-11-12
Payer: MEDICARE

## 2024-11-12 ENCOUNTER — OFFICE VISIT (OUTPATIENT)
Dept: URGENT CARE | Age: 79
End: 2024-11-12
Payer: MEDICARE

## 2024-11-12 VITALS
SYSTOLIC BLOOD PRESSURE: 160 MMHG | RESPIRATION RATE: 20 BRPM | HEIGHT: 68 IN | OXYGEN SATURATION: 98 % | WEIGHT: 180 LBS | TEMPERATURE: 98 F | HEART RATE: 71 BPM | BODY MASS INDEX: 27.28 KG/M2 | DIASTOLIC BLOOD PRESSURE: 84 MMHG

## 2024-11-12 DIAGNOSIS — M77.8 TENDONITIS OF WRIST, RIGHT: ICD-10-CM

## 2024-11-12 DIAGNOSIS — M79.644 THUMB PAIN, RIGHT: ICD-10-CM

## 2024-11-12 DIAGNOSIS — M77.8 THUMB TENDONITIS: ICD-10-CM

## 2024-11-12 DIAGNOSIS — M25.531 RIGHT WRIST PAIN: Primary | ICD-10-CM

## 2024-11-12 DIAGNOSIS — M25.531 RIGHT WRIST PAIN: ICD-10-CM

## 2024-11-12 PROCEDURE — 1036F TOBACCO NON-USER: CPT | Performed by: PHYSICIAN ASSISTANT

## 2024-11-12 PROCEDURE — 73110 X-RAY EXAM OF WRIST: CPT | Mod: RIGHT SIDE | Performed by: PHYSICIAN ASSISTANT

## 2024-11-12 PROCEDURE — 1125F AMNT PAIN NOTED PAIN PRSNT: CPT | Performed by: PHYSICIAN ASSISTANT

## 2024-11-12 PROCEDURE — 1159F MED LIST DOCD IN RCRD: CPT | Performed by: PHYSICIAN ASSISTANT

## 2024-11-12 PROCEDURE — 99204 OFFICE O/P NEW MOD 45 MIN: CPT | Performed by: PHYSICIAN ASSISTANT

## 2024-11-12 RX ORDER — CICLOPIROX OLAMINE 7.7 MG/G
CREAM TOPICAL
COMMUNITY
Start: 2024-09-16

## 2024-11-12 ASSESSMENT — ENCOUNTER SYMPTOMS
ENDOCRINE NEGATIVE: 1
ALLERGIC/IMMUNOLOGIC NEGATIVE: 1
GASTROINTESTINAL NEGATIVE: 1
PSYCHIATRIC NEGATIVE: 1
ARTHRALGIAS: 1
CARDIOVASCULAR NEGATIVE: 1
EYES NEGATIVE: 1
CONSTITUTIONAL NEGATIVE: 1
RESPIRATORY NEGATIVE: 1
WEAKNESS: 0
NUMBNESS: 0
HEMATOLOGIC/LYMPHATIC NEGATIVE: 1

## 2024-11-12 ASSESSMENT — PAIN SCALES - GENERAL: PAINLEVEL_OUTOF10: 5

## 2024-11-12 NOTE — PROGRESS NOTES
"Subjective   Patient ID: Anderson Gautam is a 79 y.o. male. They present today with a chief complaint of Injury (Patient presents today for R hand injury. He states he injured his thumb while bowling 4 weeks ago. /).    History of Present Illness    History provided by:  Patient   used: No    Injury    This is a 79 yr old male here for right thumb and wrist pain x 1 month. Sxs started when he injured it throwing a bowling ball. Pain radiates up the right forearm at times. No parasthesias or weakness. Using OTC brace without relief in sxs.   Past Medical History  Allergies as of 11/12/2024 - Reviewed 11/12/2024   Allergen Reaction Noted    Morphine Swelling 08/21/2023    Promethazine Unknown 10/21/2004       (Not in a hospital admission)       Past Medical History:   Diagnosis Date    Personal history of pneumonia (recurrent) 04/12/2017    History of pneumonia    Prediabetes 05/11/2017    Prediabetes       No past surgical history on file.     reports that he has never smoked. He has never been exposed to tobacco smoke. He has never used smokeless tobacco. He reports that he does not drink alcohol and does not use drugs.    Review of Systems  Review of Systems   Constitutional: Negative.    HENT: Negative.     Eyes: Negative.    Respiratory: Negative.     Cardiovascular: Negative.    Gastrointestinal: Negative.    Endocrine: Negative.    Genitourinary: Negative.    Musculoskeletal:  Positive for arthralgias.   Skin: Negative.    Allergic/Immunologic: Negative.    Neurological:  Negative for weakness and numbness.   Hematological: Negative.    Psychiatric/Behavioral: Negative.     All other systems reviewed and are negative.    Objective    Vitals:    11/12/24 0936   BP: 160/84   BP Location: Right arm   Patient Position: Sitting   BP Cuff Size: Adult   Pulse: 71   Resp: 20   Temp: 36.7 °C (98 °F)   TempSrc: Oral   SpO2: 98%   Weight: 81.6 kg (180 lb)   Height: 1.727 m (5' 8\")     No LMP for " male patient.    Physical Exam  Vitals and nursing note reviewed.   Constitutional:       Appearance: Normal appearance.   HENT:      Head: Normocephalic and atraumatic.   Cardiovascular:      Rate and Rhythm: Normal rate and regular rhythm.   Pulmonary:      Effort: Pulmonary effort is normal.      Breath sounds: Normal breath sounds.   Musculoskeletal:      Comments: Right UE-pain with palpation across wrist and base of right thumb, no edema or ecchymosis, FROM, distal n-v intact, no obvious deformity   Skin:     General: Skin is warm and dry.   Neurological:      General: No focal deficit present.      Mental Status: He is alert and oriented to person, place, and time.   Psychiatric:         Mood and Affect: Mood normal.         Behavior: Behavior normal.       Procedures    Point of Care Test & Imaging Results from this visit  No results found for this visit on 11/12/24.   XR wrist right 3+ views    Result Date: 11/12/2024  Interpreted By:  Marina Adair, STUDY: Right wrist, four views.   INDICATION: Signs/Symptoms:wrist and base of thumb pain x 1 month.   COMPARISON: None.   ACCESSION NUMBER(S): [id number]   ORDERING CLINICIAN: ADÁN GRANGER   FINDINGS: No acute fracture or malalignment. Moderate 1st CMC joint degenerative changes with joint space loss and osteophytes.   Soft tissues are within normal limits.       1. No acute fracture or malalignment. 2. 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   MACRO: None.   Signed by: Marina Adair 11/12/2024 11:19 AM Dictation workstation:   [id number]     Diagnostic study results (if any) were reviewed by Adán Granger PA-C.    Assessment/Plan   Allergies, medications, history, and pertinent labs/EKGs/Imaging reviewed by Adán Granger PA-C.      Orders and Diagnoses  Diagnoses and all orders for this visit:  Right wrist pain  -     XR wrist right 3+ views; Future  Thumb pain, right  -     XR wrist right 3+ views; Future  Tendonitis of wrist,  right  Thumb tendonitis    Plan:  Pt declined steroid taper [place]  Tylenol as directed for pain  Ice and elevate painful area 2-3 times a day  Has thumb spica splint to continue  Pcp or orthopedic follow up this week if not improving or worsening  ER visit anytime 24/7 for acute worsening or changing condition    Patient disposition: Home    Electronically signed by Beti Dennis PA-C  11:22 AM

## 2024-11-12 NOTE — PATIENT INSTRUCTIONS
Tylenol as directed for pain  Use your thumb/wrist brace for comfort  Ice and elevate extremity for pain or swelling  Pcp follow up this week if not improving or worsening  ER visit anytime 24/7 for acute worsening or changing condition

## 2024-11-14 ENCOUNTER — SPECIALTY PHARMACY (OUTPATIENT)
Dept: PHARMACY | Facility: CLINIC | Age: 79
End: 2024-11-14

## 2024-11-14 ENCOUNTER — LAB (OUTPATIENT)
Dept: LAB | Facility: CLINIC | Age: 79
End: 2024-11-14
Payer: MEDICARE

## 2024-11-14 DIAGNOSIS — C61 CANCER OF PROSTATE (MULTI): ICD-10-CM

## 2024-11-14 LAB
ALBUMIN SERPL BCP-MCNC: 4.2 G/DL (ref 3.4–5)
ALP SERPL-CCNC: 95 U/L (ref 33–136)
ALT SERPL W P-5'-P-CCNC: 21 U/L (ref 10–52)
ANION GAP SERPL CALC-SCNC: 12 MMOL/L (ref 10–20)
AST SERPL W P-5'-P-CCNC: 22 U/L (ref 9–39)
BASOPHILS # BLD AUTO: 0.03 X10*3/UL (ref 0–0.1)
BASOPHILS NFR BLD AUTO: 0.5 %
BILIRUB SERPL-MCNC: 0.5 MG/DL (ref 0–1.2)
BUN SERPL-MCNC: 20 MG/DL (ref 6–23)
CALCIUM SERPL-MCNC: 9.4 MG/DL (ref 8.6–10.3)
CHLORIDE SERPL-SCNC: 105 MMOL/L (ref 98–107)
CO2 SERPL-SCNC: 26 MMOL/L (ref 21–32)
CREAT SERPL-MCNC: 0.78 MG/DL (ref 0.5–1.3)
EGFRCR SERPLBLD CKD-EPI 2021: >90 ML/MIN/1.73M*2
EOSINOPHIL # BLD AUTO: 0.21 X10*3/UL (ref 0–0.4)
EOSINOPHIL NFR BLD AUTO: 3.4 %
ERYTHROCYTE [DISTWIDTH] IN BLOOD BY AUTOMATED COUNT: 13.7 % (ref 11.5–14.5)
GLUCOSE SERPL-MCNC: 124 MG/DL (ref 74–99)
HCT VFR BLD AUTO: 38.9 % (ref 41–52)
HGB BLD-MCNC: 13 G/DL (ref 13.5–17.5)
IMM GRANULOCYTES # BLD AUTO: 0.02 X10*3/UL (ref 0–0.5)
IMM GRANULOCYTES NFR BLD AUTO: 0.3 % (ref 0–0.9)
LYMPHOCYTES # BLD AUTO: 1.59 X10*3/UL (ref 0.8–3)
LYMPHOCYTES NFR BLD AUTO: 25.9 %
MCH RBC QN AUTO: 30.4 PG (ref 26–34)
MCHC RBC AUTO-ENTMCNC: 33.4 G/DL (ref 32–36)
MCV RBC AUTO: 91 FL (ref 80–100)
MONOCYTES # BLD AUTO: 0.7 X10*3/UL (ref 0.05–0.8)
MONOCYTES NFR BLD AUTO: 11.4 %
NEUTROPHILS # BLD AUTO: 3.6 X10*3/UL (ref 1.6–5.5)
NEUTROPHILS NFR BLD AUTO: 58.5 %
NRBC BLD-RTO: 0 /100 WBCS (ref 0–0)
PLATELET # BLD AUTO: 264 X10*3/UL (ref 150–450)
POTASSIUM SERPL-SCNC: 4.4 MMOL/L (ref 3.5–5.3)
PROT SERPL-MCNC: 6.6 G/DL (ref 6.4–8.2)
PSA SERPL-MCNC: 0.1 NG/ML
RBC # BLD AUTO: 4.28 X10*6/UL (ref 4.5–5.9)
SODIUM SERPL-SCNC: 139 MMOL/L (ref 136–145)
TESTOST SERPL-MCNC: <30 NG/DL (ref 240–1000)
WBC # BLD AUTO: 6.2 X10*3/UL (ref 4.4–11.3)

## 2024-11-14 PROCEDURE — 84153 ASSAY OF PSA TOTAL: CPT | Mod: PARLAB

## 2024-11-14 PROCEDURE — 36415 COLL VENOUS BLD VENIPUNCTURE: CPT

## 2024-11-14 PROCEDURE — 85025 COMPLETE CBC W/AUTO DIFF WBC: CPT

## 2024-11-14 PROCEDURE — 80053 COMPREHEN METABOLIC PANEL: CPT

## 2024-11-14 PROCEDURE — 84403 ASSAY OF TOTAL TESTOSTERONE: CPT | Mod: PARLAB

## 2024-11-14 NOTE — PROGRESS NOTES
Patient Anderson Gautam MRN [id number] has been prescribed Nubeqa from Dr. Hurley. Benefits investigation completed and patient is still currently uninsured.    Patient referred to  free drug program w/ [employer].    Patient is re- enrolled and will receive free drug and delivery will be coordinated through program's designated pharmacy. Please send future refills to [facility]. (A new script was recently sent in by Davis Luciano on 11/8/24) No action needed at this time.    Patient approved to receive Nubeqa until 12/31/25.    Thank you!

## 2024-11-18 ENCOUNTER — INFUSION (OUTPATIENT)
Dept: HEMATOLOGY/ONCOLOGY | Facility: HOSPITAL | Age: 79
End: 2024-11-18
Payer: MEDICARE

## 2024-11-18 ENCOUNTER — OFFICE VISIT (OUTPATIENT)
Dept: HEMATOLOGY/ONCOLOGY | Facility: HOSPITAL | Age: 79
End: 2024-11-18
Payer: MEDICARE

## 2024-11-18 VITALS
SYSTOLIC BLOOD PRESSURE: 156 MMHG | TEMPERATURE: 97.3 F | HEART RATE: 81 BPM | RESPIRATION RATE: 20 BRPM | DIASTOLIC BLOOD PRESSURE: 89 MMHG | OXYGEN SATURATION: 99 % | BODY MASS INDEX: 27.96 KG/M2 | WEIGHT: 183.86 LBS

## 2024-11-18 DIAGNOSIS — Z79.818 ANDROGEN DEPRIVATION THERAPY: ICD-10-CM

## 2024-11-18 DIAGNOSIS — C61 CANCER OF PROSTATE (MULTI): ICD-10-CM

## 2024-11-18 DIAGNOSIS — C61 CANCER OF PROSTATE (MULTI): Primary | ICD-10-CM

## 2024-11-18 PROCEDURE — 1159F MED LIST DOCD IN RCRD: CPT | Performed by: NURSE PRACTITIONER

## 2024-11-18 PROCEDURE — 99215 OFFICE O/P EST HI 40 MIN: CPT | Mod: 25 | Performed by: NURSE PRACTITIONER

## 2024-11-18 PROCEDURE — 1126F AMNT PAIN NOTED NONE PRSNT: CPT | Performed by: NURSE PRACTITIONER

## 2024-11-18 PROCEDURE — 99215 OFFICE O/P EST HI 40 MIN: CPT | Performed by: NURSE PRACTITIONER

## 2024-11-18 PROCEDURE — 1036F TOBACCO NON-USER: CPT | Performed by: NURSE PRACTITIONER

## 2024-11-18 PROCEDURE — 2500000004 HC RX 250 GENERAL PHARMACY W/ HCPCS (ALT 636 FOR OP/ED): Mod: JZ,JG | Performed by: NURSE PRACTITIONER

## 2024-11-18 PROCEDURE — 96402 CHEMO HORMON ANTINEOPL SQ/IM: CPT

## 2024-11-18 RX ORDER — ALBUTEROL SULFATE 0.83 MG/ML
3 SOLUTION RESPIRATORY (INHALATION) AS NEEDED
OUTPATIENT
Start: 2024-11-22

## 2024-11-18 RX ORDER — EPINEPHRINE 0.3 MG/.3ML
0.3 INJECTION SUBCUTANEOUS EVERY 5 MIN PRN
Status: CANCELLED | OUTPATIENT
Start: 2024-11-18

## 2024-11-18 RX ORDER — ALBUTEROL SULFATE 0.83 MG/ML
3 SOLUTION RESPIRATORY (INHALATION) AS NEEDED
Status: CANCELLED | OUTPATIENT
Start: 2024-11-18

## 2024-11-18 RX ORDER — FAMOTIDINE 10 MG/ML
20 INJECTION INTRAVENOUS ONCE AS NEEDED
OUTPATIENT
Start: 2024-11-22

## 2024-11-18 RX ORDER — DIPHENHYDRAMINE HYDROCHLORIDE 50 MG/ML
50 INJECTION INTRAMUSCULAR; INTRAVENOUS AS NEEDED
OUTPATIENT
Start: 2024-11-22

## 2024-11-18 RX ORDER — EPINEPHRINE 0.3 MG/.3ML
0.3 INJECTION SUBCUTANEOUS EVERY 5 MIN PRN
OUTPATIENT
Start: 2024-11-22

## 2024-11-18 RX ORDER — FAMOTIDINE 10 MG/ML
20 INJECTION INTRAVENOUS ONCE AS NEEDED
Status: CANCELLED | OUTPATIENT
Start: 2024-11-18

## 2024-11-18 RX ORDER — DIPHENHYDRAMINE HYDROCHLORIDE 50 MG/ML
50 INJECTION INTRAMUSCULAR; INTRAVENOUS AS NEEDED
Status: CANCELLED | OUTPATIENT
Start: 2024-11-18

## 2024-11-18 ASSESSMENT — PAIN SCALES - GENERAL: PAINLEVEL_OUTOF10: 0-NO PAIN

## 2024-11-18 NOTE — PROGRESS NOTES
Pt received Eligard injection as ordered without incidence after provider visit. Ambulated from clinic- has printed copy of upcoming appts and all concerns addressed.

## 2024-11-18 NOTE — PROGRESS NOTES
Patient ID: Anderson Gautam is a 79 y.o. male.  Attending Physician: Dr. Srinath Hurley  Cancer Diagnosis:  Cancer Staging   No matching staging information was found for the patient.     Current Therapy: ADT (Eligard 45 mg every 24 weeks)  Docetaxel (completed 6 cycles)  Darolutamide 600 mg BID    Genetics:   Guardant 360  AT  TP53  TMB 6.7  MSI-H not detected    Subjective      Cancer History:  Oncology History   Cancer of prostate (Multi)   2/11/2023 -  Chemotherapy    Darolutamide, 84 Day Cycles     8/21/2023 Initial Diagnosis    Cancer of prostate (CMS/HCC)       12/4/22 ED for groin and flank pain. CT A&P negative except prostatomegaly.  12/30/22 Returned ED for pain. Bone metastases found on CT of chest. PSA drawn and found to be 1050.4.  1/6/23 Prostate bx in office with Dr. Ruiz  1/17/23 ADT started/ Daro ordered  2/11/23 Darolutamide started  2/14/23 C1 Docetaxel, ADT/Daro  2/21/23 sick visit- Prednisone started for myalgias  3/7/23 C2 Docetaxel- 15% dose reduction for fatigue, myalgia, neuropathy  3/28/23 C3 docetaxel, continued reduced dose  4/18/23  C4 docetaxel, continued reduced dose  5/9/23   C5 docetaxel, continued reduced dose  5/30/23  ADT + darolutamide. No cycle 6 due to AEs  7/5/23 Continue darolutamide, ADT today  8/16/23 Decrease darolutamide to 600mg in AM and 300mg in PM for leg pain  10/4/23 Continue darolutamide at 600mg in AM and 300 mg in PM  12/19/23: Continue darolutamide at reduced dose  3/19/24: darolutamide (low dose 600+300), ADT   6/4/24: Continue darolutamide 600mg BID, ADT  9/3/24: PSA 0.11  11/14/24: PSA 0.1    Interval History:  Mr. Gautam presents in follow up today. He feels well overall. Has occasional hot flashes. Energy is good, though notes his stamina is not what it used to be. Bowling a lot, recent issues with his right thumb has kept him out lately. Gynecomastia only an issue when pressing on breasts, OK in clothing. Otherwise, he does not have new or concerning  symptoms. The remainder of his ROS is otherwise negative.  HPI    Objective    BSA: There is no height or weight on file to calculate BSA.  There were no vitals taken for this visit.    Physical Exam  General: alert, well-dressed in NAD. Speech is fluent and coherent, words clear. Good insight. Oriented x4  Skin: warm, dry, and pink without cyanosis or nail clubbing. No rash, petechiae, or ecchymoses.  HEENT: Normocephalic atraumatic. Sclera white, conjunctiva pink. EOMs intact. Hearing intact to spoken voice. No visible lesions  Respiratory: Chest expansion symmetric. No audible wheeze. Unlabored breathing.  CV: Good color   Psych: engaged, polite, appropriate conversation and eye contact.      Current Medications:    Current Outpatient Medications:     ascorbic acid (Vitamin C) 250 mg tablet, Take 1 tablet (250 mg) by mouth 2 times a day., Disp: , Rfl:     calcium citrate/vitamin D3 (CALCIUM CITRATE + D ORAL), Take 500 mg by mouth once daily., Disp: , Rfl:     ciclopirox (Loprox) 0.77 % cream, apply 1 application to (affected) skin topically 1 times per day, Disp: , Rfl:     darolutamide (Nubeqa) 300 mg tablet, Take 2 tablets (600 mg total) by mouth 2 times a day.  Take with food., Disp: 120 tablet, Rfl: 2     Most Recent Labs:  Results for orders placed or performed in visit on 11/14/24   Testosterone    Collection Time: 11/14/24  9:49 AM   Result Value Ref Range    Testosterone <30 (L) 240 - 1,000 ng/dL   Prostate Specific Antigen    Collection Time: 11/14/24  9:49 AM   Result Value Ref Range    Prostate Specific AG 0.10 <=4.00 ng/mL   Comprehensive Metabolic Panel    Collection Time: 11/14/24  9:49 AM   Result Value Ref Range    Glucose 124 (H) 74 - 99 mg/dL    Sodium 139 136 - 145 mmol/L    Potassium 4.4 3.5 - 5.3 mmol/L    Chloride 105 98 - 107 mmol/L    Bicarbonate 26 21 - 32 mmol/L    Anion Gap 12 10 - 20 mmol/L    Urea Nitrogen 20 6 - 23 mg/dL    Creatinine 0.78 0.50 - 1.30 mg/dL    eGFR >90 >60  mL/min/1.73m*2    Calcium 9.4 8.6 - 10.3 mg/dL    Albumin 4.2 3.4 - 5.0 g/dL    Alkaline Phosphatase 95 33 - 136 U/L    Total Protein 6.6 6.4 - 8.2 g/dL    AST 22 9 - 39 U/L    Bilirubin, Total 0.5 0.0 - 1.2 mg/dL    ALT 21 10 - 52 U/L   CBC and Auto Differential    Collection Time: 11/14/24  9:49 AM   Result Value Ref Range    WBC 6.2 4.4 - 11.3 x10*3/uL    nRBC 0.0 0.0 - 0.0 /100 WBCs    RBC 4.28 (L) 4.50 - 5.90 x10*6/uL    Hemoglobin 13.0 (L) 13.5 - 17.5 g/dL    Hematocrit 38.9 (L) 41.0 - 52.0 %    MCV 91 80 - 100 fL    MCH 30.4 26.0 - 34.0 pg    MCHC 33.4 32.0 - 36.0 g/dL    RDW 13.7 11.5 - 14.5 %    Platelets 264 150 - 450 x10*3/uL    Neutrophils % 58.5 40.0 - 80.0 %    Immature Granulocytes %, Automated 0.3 0.0 - 0.9 %    Lymphocytes % 25.9 13.0 - 44.0 %    Monocytes % 11.4 2.0 - 10.0 %    Eosinophils % 3.4 0.0 - 6.0 %    Basophils % 0.5 0.0 - 2.0 %    Neutrophils Absolute 3.60 1.60 - 5.50 x10*3/uL    Immature Granulocytes Absolute, Automated 0.02 0.00 - 0.50 x10*3/uL    Lymphocytes Absolute 1.59 0.80 - 3.00 x10*3/uL    Monocytes Absolute 0.70 0.05 - 0.80 x10*3/uL    Eosinophils Absolute 0.21 0.00 - 0.40 x10*3/uL    Basophils Absolute 0.03 0.00 - 0.10 x10*3/uL      Lab Results   Component Value Date    PSA 0.10 11/14/2024    PSA 0.11 08/30/2024    PSA 0.12 06/03/2024        Performance Status:  ECOG Score: 0- Fully active, able to carry on all pre-disease performance w/o restriction.  Karnofsky Score: 100 - Fully active, able to carry on all pre-disease performed without restriction    Assessment/Plan   Anderson Gautam is a 79 y.o. 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 He was initially dose reduced on daro but went to full dose 3/2023 and has been tolerating well.    He has hot flashes, fatigue, and gynecomastia, which we will continue to  monitor. PSA continues to decline. Due for scans prior to next visit as well as DEXA.    # HV mCSPC  - Continue ADT, due today and again in May 2025  - Continue darolutamide    # Gynecomastia  - Mammogram recommended in 6 mos, March 2025    # Bone Health  - Continue calcium and vitamin D supplementation  - Continue regular, weight-bearing physical activity  - Last DEXA unknown, DEXA ordered prior to next visit    # Health Maintenance  - Continue with PCP and other healthcare providers  - Advised to continue exercise, heart-healthy diet    RTC 12 weeks with scans and labs prior    Total time spent on this encounter was 60 minutes, which included preparation, direct time with patient, documentation, and care coordination on the day of visit.    Marybeth Duenas, MSN, APRN, AGNP-C, AOCNP  Associate Nurse Practitioner  [facility]

## 2025-01-27 ENCOUNTER — HOSPITAL ENCOUNTER (OUTPATIENT)
Dept: RADIOLOGY | Facility: CLINIC | Age: 80
Discharge: HOME | End: 2025-01-27
Payer: MEDICARE

## 2025-01-27 DIAGNOSIS — C61 CANCER OF PROSTATE (MULTI): ICD-10-CM

## 2025-01-27 DIAGNOSIS — Z79.818 ANDROGEN DEPRIVATION THERAPY: ICD-10-CM

## 2025-01-27 PROCEDURE — 77080 DXA BONE DENSITY AXIAL: CPT

## 2025-01-27 PROCEDURE — 77080 DXA BONE DENSITY AXIAL: CPT | Performed by: RADIOLOGY

## 2025-01-30 ENCOUNTER — LAB (OUTPATIENT)
Dept: LAB | Facility: CLINIC | Age: 80
End: 2025-01-30
Payer: MEDICARE

## 2025-01-30 DIAGNOSIS — C61 CANCER OF PROSTATE (MULTI): ICD-10-CM

## 2025-01-30 LAB
ALBUMIN SERPL BCP-MCNC: 4.1 G/DL (ref 3.4–5)
ALP SERPL-CCNC: 98 U/L (ref 33–136)
ALT SERPL W P-5'-P-CCNC: 22 U/L (ref 10–52)
ANION GAP SERPL CALC-SCNC: 12 MMOL/L (ref 10–20)
AST SERPL W P-5'-P-CCNC: 22 U/L (ref 9–39)
BASOPHILS # BLD AUTO: 0.03 X10*3/UL (ref 0–0.1)
BASOPHILS NFR BLD AUTO: 0.5 %
BILIRUB SERPL-MCNC: 0.4 MG/DL (ref 0–1.2)
BUN SERPL-MCNC: 20 MG/DL (ref 6–23)
CALCIUM SERPL-MCNC: 9.4 MG/DL (ref 8.6–10.3)
CHLORIDE SERPL-SCNC: 103 MMOL/L (ref 98–107)
CO2 SERPL-SCNC: 28 MMOL/L (ref 21–32)
CREAT SERPL-MCNC: 0.8 MG/DL (ref 0.5–1.3)
EGFRCR SERPLBLD CKD-EPI 2021: >90 ML/MIN/1.73M*2
EOSINOPHIL # BLD AUTO: 0.26 X10*3/UL (ref 0–0.4)
EOSINOPHIL NFR BLD AUTO: 4.6 %
ERYTHROCYTE [DISTWIDTH] IN BLOOD BY AUTOMATED COUNT: 13.7 % (ref 11.5–14.5)
GLUCOSE SERPL-MCNC: 118 MG/DL (ref 74–99)
HCT VFR BLD AUTO: 38.4 % (ref 41–52)
HGB BLD-MCNC: 12.6 G/DL (ref 13.5–17.5)
IMM GRANULOCYTES # BLD AUTO: 0.01 X10*3/UL (ref 0–0.5)
IMM GRANULOCYTES NFR BLD AUTO: 0.2 % (ref 0–0.9)
LYMPHOCYTES # BLD AUTO: 1.54 X10*3/UL (ref 0.8–3)
LYMPHOCYTES NFR BLD AUTO: 27.1 %
MCH RBC QN AUTO: 29.9 PG (ref 26–34)
MCHC RBC AUTO-ENTMCNC: 32.8 G/DL (ref 32–36)
MCV RBC AUTO: 91 FL (ref 80–100)
MONOCYTES # BLD AUTO: 0.62 X10*3/UL (ref 0.05–0.8)
MONOCYTES NFR BLD AUTO: 10.9 %
NEUTROPHILS # BLD AUTO: 3.22 X10*3/UL (ref 1.6–5.5)
NEUTROPHILS NFR BLD AUTO: 56.7 %
NRBC BLD-RTO: 0 /100 WBCS (ref 0–0)
PLATELET # BLD AUTO: 271 X10*3/UL (ref 150–450)
POTASSIUM SERPL-SCNC: 4.6 MMOL/L (ref 3.5–5.3)
PROT SERPL-MCNC: 6.5 G/DL (ref 6.4–8.2)
PSA SERPL-MCNC: 0.1 NG/ML
RBC # BLD AUTO: 4.21 X10*6/UL (ref 4.5–5.9)
SODIUM SERPL-SCNC: 138 MMOL/L (ref 136–145)
WBC # BLD AUTO: 5.7 X10*3/UL (ref 4.4–11.3)

## 2025-01-30 PROCEDURE — 84402 ASSAY OF FREE TESTOSTERONE: CPT

## 2025-01-30 PROCEDURE — 84075 ASSAY ALKALINE PHOSPHATASE: CPT

## 2025-01-30 PROCEDURE — 84153 ASSAY OF PSA TOTAL: CPT | Mod: PARLAB

## 2025-01-30 PROCEDURE — 85025 COMPLETE CBC W/AUTO DIFF WBC: CPT

## 2025-01-30 PROCEDURE — 36415 COLL VENOUS BLD VENIPUNCTURE: CPT

## 2025-02-03 ENCOUNTER — HOSPITAL ENCOUNTER (OUTPATIENT)
Dept: RADIOLOGY | Facility: HOSPITAL | Age: 80
Discharge: HOME | End: 2025-02-03
Payer: MEDICARE

## 2025-02-03 DIAGNOSIS — C61 CANCER OF PROSTATE (MULTI): ICD-10-CM

## 2025-02-03 PROCEDURE — A9503 TC99M MEDRONATE: HCPCS | Performed by: NURSE PRACTITIONER

## 2025-02-03 PROCEDURE — 78306 BONE IMAGING WHOLE BODY: CPT

## 2025-02-03 PROCEDURE — 71260 CT THORAX DX C+: CPT

## 2025-02-03 PROCEDURE — 78306 BONE IMAGING WHOLE BODY: CPT | Performed by: STUDENT IN AN ORGANIZED HEALTH CARE EDUCATION/TRAINING PROGRAM

## 2025-02-03 PROCEDURE — 2550000001 HC RX 255 CONTRASTS: Performed by: NURSE PRACTITIONER

## 2025-02-03 PROCEDURE — 3430000001 HC RX 343 DIAGNOSTIC RADIOPHARMACEUTICALS: Performed by: NURSE PRACTITIONER

## 2025-02-03 RX ADMIN — TECHNETIUM TC 99M MEDRONATE 26 MILLICURIE: 25 INJECTION, POWDER, FOR SOLUTION INTRAVENOUS at 08:00

## 2025-02-03 RX ADMIN — IOHEXOL 75 ML: 350 INJECTION, SOLUTION INTRAVENOUS at 09:44

## 2025-02-04 LAB
TESTOSTERONE FREE (CHAN): 1.3 PG/ML (ref 30–135)
TESTOSTERONE,TOTAL,LC-MS/MS: 13 NG/DL (ref 250–1100)

## 2025-02-11 ENCOUNTER — OFFICE VISIT (OUTPATIENT)
Dept: HEMATOLOGY/ONCOLOGY | Facility: HOSPITAL | Age: 80
End: 2025-02-11
Payer: MEDICARE

## 2025-02-11 VITALS
TEMPERATURE: 97.9 F | WEIGHT: 179 LBS | OXYGEN SATURATION: 100 % | SYSTOLIC BLOOD PRESSURE: 161 MMHG | HEART RATE: 91 BPM | DIASTOLIC BLOOD PRESSURE: 92 MMHG | BODY MASS INDEX: 27.22 KG/M2

## 2025-02-11 DIAGNOSIS — C61 CANCER OF PROSTATE (MULTI): ICD-10-CM

## 2025-02-11 PROCEDURE — 99214 OFFICE O/P EST MOD 30 MIN: CPT | Performed by: STUDENT IN AN ORGANIZED HEALTH CARE EDUCATION/TRAINING PROGRAM

## 2025-02-11 PROCEDURE — 1126F AMNT PAIN NOTED NONE PRSNT: CPT | Performed by: STUDENT IN AN ORGANIZED HEALTH CARE EDUCATION/TRAINING PROGRAM

## 2025-02-11 PROCEDURE — 1159F MED LIST DOCD IN RCRD: CPT | Performed by: STUDENT IN AN ORGANIZED HEALTH CARE EDUCATION/TRAINING PROGRAM

## 2025-02-11 PROCEDURE — 1160F RVW MEDS BY RX/DR IN RCRD: CPT | Performed by: STUDENT IN AN ORGANIZED HEALTH CARE EDUCATION/TRAINING PROGRAM

## 2025-02-11 ASSESSMENT — ENCOUNTER SYMPTOMS
NUMBNESS: 0
MYALGIAS: 0
LEG SWELLING: 0
APPETITE CHANGE: 0
UNEXPECTED WEIGHT CHANGE: 0
DIZZINESS: 0
DYSURIA: 0
HEMATURIA: 0
EXTREMITY WEAKNESS: 0
HOT FLASHES: 1
SHORTNESS OF BREATH: 0
COUGH: 0
FEVER: 0
DIARRHEA: 0
BACK PAIN: 0
CONSTIPATION: 0
ABDOMINAL PAIN: 0
HEMATOLOGIC/LYMPHATIC NEGATIVE: 1
PSYCHIATRIC NEGATIVE: 1
ARTHRALGIAS: 0
CHILLS: 0
FATIGUE: 0
VOMITING: 0
HEADACHES: 0
NECK PAIN: 0
EYES NEGATIVE: 1
DIFFICULTY URINATING: 0
NAUSEA: 0

## 2025-02-11 ASSESSMENT — PAIN SCALES - GENERAL: PAINLEVEL_OUTOF10: 0-NO PAIN

## 2025-02-11 NOTE — PROGRESS NOTES
Patient ID: Anderson Gautam is a 79 y.o. male.  Diagnosis:  metastatic high volume [facility]  MedOnc: Dr. Hurley  PCP: Poli Soriano    Patient Care Team:  Srinath Hurley MD as PCP - General (Hematology and Oncology)  Srinath Hurley MD as Consulting Physician (Hematology and Oncology)    Current Therapy: darolutamide + ADT    ONCOLOGIC HISTORY  PCP Poli Soriano, seen last in 2019, at the time PSA was normal per patient.   Last PSA was 4.2 at age 71.      12/4/22 ED for groin and flank pain. CT A&P negative except prostatomegaly.  12/30/22 Returned ED for pain. Bone metastases found on CT of chest. PSA drawn 1050.4.  1/6/23 Prostate bx in office with Dr. Ruiz  1/17/23 ADT started/ Daro ordered  2/11/23 Darolutamide started  2/14/23 C1 Docetaxel, ADT/Daro  2/21/23 sick visit- Prednisone started for myalgias  3/7/23 C2 Docetaxel- 15% dose reduction for fatigue, myalgia, neuropathy  3/28/23 C3 docetaxel, continued reduced dose  4/18/23  C4 docetaxel, continued reduced dose  5/9/23   C5 docetaxel, continued reduced dose  5/30/23  ADT + darolutamide. No cycle 6 due to AEs  7/5/23 Continue darolutamide, ADT today  8/16/23 Decrease darolutamide to 600mg in AM and 300mg in PM for leg pain  10/4/23 Continue darolutamide at 600mg in AM and 300 mg in PM  12/19/23: Continue darolutamide at reduced dose  3/19/24 - darolutamide (low dose 600+300), ADT   2/11/25 - ADT/darolutamide    Component  Ref Range & Units 12 d ago  (1/30/25) 2 mo ago  (11/14/24) 5 mo ago  (8/30/24) 8 mo ago  (6/3/24) 10 mo ago  (3/19/24) 1 yr ago  (12/18/23) 1 yr ago  (10/2/23)   Prostate Specific AG  <=4.00 ng/mL 0.10 0.10 0.11 0.12 0.22 0.21 0.31        Past Medical History: Anderson has a past medical history of Personal history of pneumonia (recurrent) (04/12/2017) and Prediabetes (05/11/2017).  Surgical History:  Anderson has no past surgical history on file.  Social History:  Anderson reports that he has never smoked. He has never been exposed to  tobacco smoke. He has never used smokeless tobacco. He reports that he does not drink alcohol and does not use drugs.  Family History:    Family History   Problem Relation Name Age of Onset    Emphysema Mother      COPD Mother      Cancer Mother      Emphysema Father      COPD Father       Family Oncology History:  Cancer-related family history includes Cancer in his mother.    Review of Systems   Constitutional:  Negative for appetite change, chills, fatigue, fever and unexpected weight change.   HENT:  Negative.     Eyes: Negative.    Respiratory:  Negative for cough and shortness of breath.    Cardiovascular:  Negative for chest pain and leg swelling.   Gastrointestinal:  Negative for abdominal pain, constipation, diarrhea, nausea and vomiting.   Endocrine: Positive for hot flashes.   Genitourinary:  Negative for difficulty urinating, dysuria and hematuria.    Musculoskeletal:  Negative for arthralgias, back pain, myalgias and neck pain.   Skin:  Negative for itching and rash.   Neurological:  Negative for dizziness, extremity weakness, headaches and numbness.   Hematological: Negative.    Psychiatric/Behavioral: Negative.       Allergies  Allergies   Allergen Reactions    Morphine Swelling    Promethazine Unknown     10/21/2004;MUSCLE SPASMS, DYSTONIC REACTION, 10/21/2004;MUSCLE SPASMS, DYSTONIC REACTION      Medications  Current Outpatient Medications   Medication Instructions    ascorbic acid (VITAMIN C) 250 mg, 2 times daily    calcium citrate/vitamin D3 (CALCIUM CITRATE + D ORAL) 500 mg, Daily    ciclopirox (Loprox) 0.77 % cream apply 1 application to (affected) skin topically 1 times per day    darolutamide (NUBEQA) 600 mg, oral, 2 times daily, Take with food.        OBJECTIVE:    VS / Pain:  There were no vitals taken for this visit.  BSA: There is no height or weight on file to calculate BSA.  Wt Readings from Last 5 Encounters:   11/18/24 83.4 kg (183 lb 13.8 oz)   11/12/24 81.6 kg (180 lb)   11/07/24  81.5 kg (179 lb 10.8 oz)   09/03/24 81.5 kg (179 lb 10.8 oz)   06/04/24 80.4 kg (177 lb 4 oz)      Pain Scale: 0    Physical Exam  Constitutional:       General: He is awake. He is not in acute distress.     Appearance: Normal appearance.   HENT:      Head: Normocephalic and atraumatic.      Nose: Nose normal.      Mouth/Throat:      Mouth: Mucous membranes are moist. No oral lesions.      Tongue: No lesions.   Eyes:      Pupils: Pupils are equal, round, and reactive to light.   Cardiovascular:      Rate and Rhythm: Normal rate and regular rhythm.      Heart sounds: Normal heart sounds, S1 normal and S2 normal. No murmur heard.  Pulmonary:      Effort: Pulmonary effort is normal.      Breath sounds: Normal breath sounds and air entry.   Abdominal:      General: Bowel sounds are normal. There is no distension.      Palpations: Abdomen is soft.      Tenderness: There is no abdominal tenderness. There is no guarding.   Musculoskeletal:      Cervical back: Full passive range of motion without pain.      Right lower leg: No edema.      Left lower leg: No edema.   Lymphadenopathy:      Head:      Right side of head: No submental, submandibular, tonsillar, preauricular, posterior auricular or occipital adenopathy.      Left side of head: No submental, submandibular, tonsillar, preauricular, posterior auricular or occipital adenopathy.      Cervical: No cervical adenopathy.      Right cervical: No superficial cervical adenopathy.     Left cervical: No superficial cervical adenopathy.      Upper Body:      Right upper body: No supraclavicular adenopathy.      Left upper body: No supraclavicular adenopathy.   Skin:     General: Skin is warm and dry.      Capillary Refill: Capillary refill takes less than 2 seconds.      Findings: No rash.   Neurological:      General: No focal deficit present.      Mental Status: He is alert and oriented to person, place, and time.      Motor: Motor function is intact.      Gait: Gait is  intact.   Psychiatric:         Attention and Perception: Attention normal.         Mood and Affect: Mood normal.         Speech: Speech normal.         Behavior: Behavior normal. Behavior is cooperative.         Thought Content: Thought content normal.         Cognition and Memory: Cognition and memory normal.         Judgment: Judgment normal.     Performance Status:  ECOG Score: 0- Fully active, able to carry on all pre-disease performance w/o restriction.  Karnofsky Score: 90 - Able to carry on normal activity; minor signs or symptoms of disease     Diagnostic Results   No results found for this or any previous visit (from the past 96 hours).    Lab Results   Component Value Date    PSA 0.10 01/30/2025    PSA 0.10 11/14/2024    PSA 0.11 08/30/2024     Lab Results   Component Value Date    TESTOTOTMS 13 (L) 01/30/2025     Lab Results   Component Value Date    TESTOSTERONE <30 (L) 11/14/2024 02/2025 Bone Scan and CT Scans -   1. Stable to slight decrease in the extent of multifocal radiotracer  uptake throughout the axial and appendicular skeleton, consistent  with treated bone metastases.  2. No scintigraphic evidence of new osseous metastatic disease.    Prostate cancer restaging scan, in comparison to prior CT from March 2024:  1. No significant interval change with no new site of disease in the  chest, abdomen and pelvis. Diffuse osseous metastatic disease appears  unchanged, with evaluation for osseous tumor burden to be correlated  with concurrent bone scan, reported separately.  2. Stable right upper lobe 3 mm nodule, which bears watching on  future follow-up evaluations.  3. Additional chronic and incidental findings as detailed above.      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 Will try full dose darolutamide and see us back in 3 months for ADT. Stable scans. Residual neuropathy and  now some breast tenderness. Monitor. CPM  I saw and evaluated the patient. I personally obtained the key and critical portions of the history and physical exam or was physically present for key and critical portions performed by the resident/fellow. I reviewed the resident/fellow's documentation and discussed the patient with the resident/fellow. I agree with the resident/fellow's medical decision making as documented in the note.   Srinath Hurley MD MSc FACP  [facility] Chair in Cancer Research   in Medicine [facility] [employer]  Director Clinical  Medical Oncology Research Program   [facility]  Cell [phone]  Office [phone]

## 2025-02-20 ENCOUNTER — TELEPHONE (OUTPATIENT)
Dept: ADMISSION | Facility: HOSPITAL | Age: 80
End: 2025-02-20
Payer: MEDICARE

## 2025-02-20 DIAGNOSIS — C61 CANCER OF PROSTATE (MULTI): ICD-10-CM

## 2025-02-20 NOTE — TELEPHONE ENCOUNTER
Refill Request  Darolutamide (Nubeqa) 300mg 2 times daily    Preferred Pharmacy  [facility]

## 2025-05-05 ENCOUNTER — APPOINTMENT (OUTPATIENT)
Dept: HEMATOLOGY/ONCOLOGY | Facility: HOSPITAL | Age: 80
End: 2025-05-05
Payer: MEDICARE

## 2025-05-08 ENCOUNTER — LAB (OUTPATIENT)
Dept: LAB | Facility: CLINIC | Age: 80
End: 2025-05-08
Payer: MEDICARE

## 2025-05-08 DIAGNOSIS — C61 CANCER OF PROSTATE (MULTI): ICD-10-CM

## 2025-05-08 LAB
ALBUMIN SERPL BCP-MCNC: 4.4 G/DL (ref 3.4–5)
ALP SERPL-CCNC: 89 U/L (ref 33–136)
ALT SERPL W P-5'-P-CCNC: 26 U/L (ref 10–52)
ANION GAP SERPL CALC-SCNC: 11 MMOL/L (ref 10–20)
AST SERPL W P-5'-P-CCNC: 24 U/L (ref 9–39)
BILIRUB SERPL-MCNC: 0.5 MG/DL (ref 0–1.2)
BUN SERPL-MCNC: 20 MG/DL (ref 6–23)
CALCIUM SERPL-MCNC: 9.6 MG/DL (ref 8.6–10.3)
CHLORIDE SERPL-SCNC: 104 MMOL/L (ref 98–107)
CO2 SERPL-SCNC: 28 MMOL/L (ref 21–32)
CREAT SERPL-MCNC: 0.79 MG/DL (ref 0.5–1.3)
EGFRCR SERPLBLD CKD-EPI 2021: >90 ML/MIN/1.73M*2
ERYTHROCYTE [DISTWIDTH] IN BLOOD BY AUTOMATED COUNT: 14 % (ref 11.5–14.5)
GLUCOSE SERPL-MCNC: 131 MG/DL (ref 74–99)
HCT VFR BLD AUTO: 40.3 % (ref 41–52)
HGB BLD-MCNC: 13.1 G/DL (ref 13.5–17.5)
MCH RBC QN AUTO: 29.6 PG (ref 26–34)
MCHC RBC AUTO-ENTMCNC: 32.5 G/DL (ref 32–36)
MCV RBC AUTO: 91 FL (ref 80–100)
NRBC BLD-RTO: 0 /100 WBCS (ref 0–0)
PLATELET # BLD AUTO: 248 X10*3/UL (ref 150–450)
POTASSIUM SERPL-SCNC: 4.3 MMOL/L (ref 3.5–5.3)
PROT SERPL-MCNC: 6.7 G/DL (ref 6.4–8.2)
PSA SERPL-MCNC: 0.13 NG/ML
RBC # BLD AUTO: 4.43 X10*6/UL (ref 4.5–5.9)
SODIUM SERPL-SCNC: 139 MMOL/L (ref 136–145)
WBC # BLD AUTO: 6.2 X10*3/UL (ref 4.4–11.3)

## 2025-05-08 PROCEDURE — 80053 COMPREHEN METABOLIC PANEL: CPT

## 2025-05-08 PROCEDURE — 84270 ASSAY OF SEX HORMONE GLOBUL: CPT

## 2025-05-08 PROCEDURE — 84153 ASSAY OF PSA TOTAL: CPT | Mod: PARLAB

## 2025-05-08 PROCEDURE — 84402 ASSAY OF FREE TESTOSTERONE: CPT

## 2025-05-08 PROCEDURE — 85027 COMPLETE CBC AUTOMATED: CPT

## 2025-05-08 PROCEDURE — 36415 COLL VENOUS BLD VENIPUNCTURE: CPT

## 2025-05-13 ENCOUNTER — OFFICE VISIT (OUTPATIENT)
Dept: HEMATOLOGY/ONCOLOGY | Facility: HOSPITAL | Age: 80
End: 2025-05-13
Payer: MEDICARE

## 2025-05-13 ENCOUNTER — INFUSION (OUTPATIENT)
Dept: HEMATOLOGY/ONCOLOGY | Facility: HOSPITAL | Age: 80
End: 2025-05-13
Payer: MEDICARE

## 2025-05-13 VITALS
HEART RATE: 65 BPM | BODY MASS INDEX: 27.92 KG/M2 | SYSTOLIC BLOOD PRESSURE: 157 MMHG | WEIGHT: 183.64 LBS | OXYGEN SATURATION: 98 % | DIASTOLIC BLOOD PRESSURE: 79 MMHG | RESPIRATION RATE: 20 BRPM | TEMPERATURE: 97.3 F

## 2025-05-13 DIAGNOSIS — C61 CANCER OF PROSTATE (MULTI): ICD-10-CM

## 2025-05-13 DIAGNOSIS — C61 PROSTATE CANCER (MULTI): Primary | ICD-10-CM

## 2025-05-13 PROCEDURE — 99214 OFFICE O/P EST MOD 30 MIN: CPT

## 2025-05-13 PROCEDURE — 1126F AMNT PAIN NOTED NONE PRSNT: CPT

## 2025-05-13 PROCEDURE — 2500000004 HC RX 250 GENERAL PHARMACY W/ HCPCS (ALT 636 FOR OP/ED): Mod: JZ,TB | Performed by: NURSE PRACTITIONER

## 2025-05-13 PROCEDURE — 1036F TOBACCO NON-USER: CPT

## 2025-05-13 PROCEDURE — 96402 CHEMO HORMON ANTINEOPL SQ/IM: CPT

## 2025-05-13 RX ORDER — EPINEPHRINE 0.3 MG/.3ML
0.3 INJECTION SUBCUTANEOUS EVERY 5 MIN PRN
OUTPATIENT
Start: 2025-07-29

## 2025-05-13 RX ORDER — FAMOTIDINE 10 MG/ML
20 INJECTION, SOLUTION INTRAVENOUS ONCE AS NEEDED
OUTPATIENT
Start: 2025-07-29

## 2025-05-13 RX ORDER — DIPHENHYDRAMINE HYDROCHLORIDE 50 MG/ML
50 INJECTION, SOLUTION INTRAMUSCULAR; INTRAVENOUS AS NEEDED
OUTPATIENT
Start: 2025-07-29

## 2025-05-13 RX ORDER — ALBUTEROL SULFATE 0.83 MG/ML
3 SOLUTION RESPIRATORY (INHALATION) AS NEEDED
OUTPATIENT
Start: 2025-07-29

## 2025-05-13 RX ADMIN — LEUPROLIDE ACETATE 45 MG: 45 INJECTION, SUSPENSION, EXTENDED RELEASE SUBCUTANEOUS at 12:07

## 2025-05-13 ASSESSMENT — ENCOUNTER SYMPTOMS
APPETITE CHANGE: 0
PSYCHIATRIC NEGATIVE: 1
HOT FLASHES: 1
ARTHRALGIAS: 0
SHORTNESS OF BREATH: 0
BACK PAIN: 0
EYES NEGATIVE: 1
NUMBNESS: 0
DIZZINESS: 0
MYALGIAS: 0
CONSTIPATION: 0
CHILLS: 0
ABDOMINAL PAIN: 0
LEG SWELLING: 0
HEMATURIA: 0
COUGH: 0
NECK PAIN: 0
DYSURIA: 0
VOMITING: 0
DIFFICULTY URINATING: 0
HEMATOLOGIC/LYMPHATIC NEGATIVE: 1
FATIGUE: 0
DIARRHEA: 0
EXTREMITY WEAKNESS: 0
UNEXPECTED WEIGHT CHANGE: 0
HEADACHES: 0
NAUSEA: 0
FEVER: 0

## 2025-05-13 ASSESSMENT — PAIN SCALES - GENERAL: PAINLEVEL_OUTOF10: 0-NO PAIN

## 2025-05-13 NOTE — PROGRESS NOTES
Patient ID: Anderson Gautam is a 79 y.o. male.  Diagnosis:  metastatic high volume \[facility] [place] [facility]\""  MedOnc: Dr. Hurley  PCP: Poli Soriano    Patient Care Team:  Srinath Hurley MD as PCP - General (Hematology and Oncology)  Srinath Hurley MD as Consulting Physician (Hematology and Oncology)    Current Therapy: darolutamide + ADT    ONCOLOGIC HISTORY  PCP Poli Soriano, seen last in 2019, at the time PSA was normal per patient.   Last PSA was 4.2 at age 71.      12/4/22 ED for groin and flank pain. CT A&P negative except prostatomegaly.  12/30/22 Returned ED for pain. Bone metastases found on CT of chest. PSA drawn 1050.4.  1/6/23 Prostate bx in office with Dr. Ruiz  1/17/23 ADT started/ Daro ordered  2/11/23 Darolutamide started  2/14/23 C1 Docetaxel, ADT/Daro  2/21/23 sick visit- Prednisone started for myalgias  3/7/23 C2 Docetaxel- 15% dose reduction for fatigue, myalgia, neuropathy  3/28/23 C3 docetaxel, continued reduced dose  4/18/23  C4 docetaxel, continued reduced dose  5/9/23   C5 docetaxel, continued reduced dose  5/30/23  ADT + darolutamide. No cycle 6 due to AEs  7/5/23 Continue darolutamide, ADT today  8/16/23 Decrease darolutamide to 600mg in AM and 300mg in PM for leg pain  10/4/23 Continue darolutamide at 600mg in AM and 300 mg in PM  12/19/23: Continue darolutamide at reduced dose  3/19/24 - darolutamide (low dose 600+300), ADT   2/11/25 - ADT/darolutamide   5/13/25 - ADT/daro full dose    Component  Ref Range & Units 5 d ago  (5/8/25) 3 mo ago  (1/30/25) 6 mo ago  (11/14/24) 8 mo ago  (8/30/24) 11 mo ago  (6/3/24) 1 yr ago  (3/19/24) 1 yr ago  (12/18/23)   Prostate Specific AG  <=4.00 ng/mL 0.13 0.10 0.10 0.11 0.12 0.22 0.21       Past Medical History: Anderson has a past medical history of Personal history of pneumonia (recurrent) (04/12/2017) and Prediabetes (05/11/2017).    Surgical History:  Anderson has no past surgical history on file.    Social History:  Anderson reports that he has never smoked.  He has never been exposed to tobacco smoke. He has never used smokeless tobacco. He reports that he does not drink alcohol and does not use drugs.    Family History:    Family History   Problem Relation Name Age of Onset    Emphysema Mother      COPD Mother      Cancer Mother      Emphysema Father      COPD Father       Family Oncology History:  Cancer-related family history includes Cancer in his mother.    Review of Systems   Constitutional:  Negative for appetite change, chills, fatigue, fever and unexpected weight change.   HENT:  Negative.     Eyes: Negative.    Respiratory:  Negative for cough and shortness of breath.    Cardiovascular:  Negative for chest pain and leg swelling.   Gastrointestinal:  Negative for abdominal pain, constipation, diarrhea, nausea and vomiting.   Endocrine: Positive for hot flashes.   Genitourinary:  Negative for difficulty urinating, dysuria and hematuria.    Musculoskeletal:  Negative for arthralgias, back pain, myalgias and neck pain.   Skin:  Negative for itching and rash.   Neurological:  Negative for dizziness, extremity weakness, headaches and numbness.   Hematological: Negative.    Psychiatric/Behavioral: Negative.       Allergies  Allergies   Allergen Reactions    Morphine Swelling    Promethazine Unknown     10/21/2004;MUSCLE SPASMS, DYSTONIC REACTION, 10/21/2004;MUSCLE SPASMS, DYSTONIC REACTION      Medications  Current Outpatient Medications   Medication Instructions    ascorbic acid (VITAMIN C) 250 mg, 2 times daily    calcium citrate/vitamin D3 (CALCIUM CITRATE + D ORAL) 500 mg, Daily    ciclopirox (Loprox) 0.77 % cream apply 1 application to (affected) skin topically 1 times per day    darolutamide (NUBEQA) 600 mg, oral, 2 times daily, Take with food.        OBJECTIVE:    VS / Pain:  /79 (BP Location: Left arm, Patient Position: Sitting, BP Cuff Size: Adult)   Pulse 65   Temp 36.3 °C (97.3 °F) (Temporal)   Resp 20   Wt 83.3 kg (183 lb 10.3 oz)   SpO2 98%    BMI 27.92 kg/m²   BSA: 2 meters squared  Wt Readings from Last 5 Encounters:   05/13/25 83.3 kg (183 lb 10.3 oz)   02/11/25 81.2 kg (179 lb)   11/18/24 83.4 kg (183 lb 13.8 oz)   11/12/24 81.6 kg (180 lb)   11/07/24 81.5 kg (179 lb 10.8 oz)      Pain Scale: 0    Physical Exam  Constitutional:       General: He is awake. He is not in acute distress.     Appearance: Normal appearance.   HENT:      Head: Normocephalic and atraumatic.      Nose: Nose normal.      Mouth/Throat:      Mouth: Mucous membranes are moist. No oral lesions.      Tongue: No lesions.   Eyes:      Pupils: Pupils are equal, round, and reactive to light.   Cardiovascular:      Rate and Rhythm: Normal rate and regular rhythm.      Heart sounds: Normal heart sounds, S1 normal and S2 normal. No murmur heard.  Pulmonary:      Effort: Pulmonary effort is normal.      Breath sounds: Normal breath sounds and air entry.   Abdominal:      General: Bowel sounds are normal. There is no distension.      Palpations: Abdomen is soft.      Tenderness: There is no abdominal tenderness. There is no guarding.   Musculoskeletal:      Cervical back: Full passive range of motion without pain.      Right lower leg: No edema.      Left lower leg: No edema.   Lymphadenopathy:      Head:      Right side of head: No submental, submandibular, tonsillar, preauricular, posterior auricular or occipital adenopathy.      Left side of head: No submental, submandibular, tonsillar, preauricular, posterior auricular or occipital adenopathy.      Cervical: No cervical adenopathy.      Right cervical: No superficial cervical adenopathy.     Left cervical: No superficial cervical adenopathy.      Upper Body:      Right upper body: No supraclavicular adenopathy.      Left upper body: No supraclavicular adenopathy.   Skin:     General: Skin is warm and dry.      Capillary Refill: Capillary refill takes less than 2 seconds.      Findings: No rash.   Neurological:      General: No focal  deficit present.      Mental Status: He is alert and oriented to person, place, and time.      Motor: Motor function is intact.      Gait: Gait is intact.   Psychiatric:         Attention and Perception: Attention normal.         Mood and Affect: Mood normal.         Speech: Speech normal.         Behavior: Behavior normal. Behavior is cooperative.         Thought Content: Thought content normal.         Cognition and Memory: Cognition and memory normal.         Judgment: Judgment normal.     Performance Status:  ECOG Score: 0- Fully active, able to carry on all pre-disease performance w/o restriction.  Karnofsky Score: 90 - Able to carry on normal activity; minor signs or symptoms of disease     Diagnostic Results   No results found for this or any previous visit (from the past 96 hours).    Lab Results   Component Value Date    PSA 0.13 05/08/2025    PSA 0.10 01/30/2025    PSA 0.10 11/14/2024     Lab Results   Component Value Date    TESTOTOTMS 13 (L) 01/30/2025     Lab Results   Component Value Date    TESTOSTERONE <30 (L) 11/14/2024 11/2024 BI mammo bilatera / US breast limited bilateral  Findings most suggestive of bilateral gynecomastia as described.  Otherwise no definite focal suspicious mammographic or sonographic  findings underlying areas of reported pain.    02/2025 Bone Scan and CT Scans -   1. Stable to slight decrease in the extent of multifocal radiotracer  uptake throughout the axial and appendicular skeleton, consistent  with treated bone metastases.  2. No scintigraphic evidence of new osseous metastatic disease.    Prostate cancer restaging scan, in comparison to prior CT from March 2024:  1. No significant interval change with no new site of disease in the  chest, abdomen and pelvis. Diffuse osseous metastatic disease appears  unchanged, with evaluation for osseous tumor burden to be correlated  with concurrent bone scan, reported separately.  2. Stable right upper lobe 3 mm nodule, which  bears watching on  future follow-up evaluations.  3. Additional chronic and incidental findings as detailed above.      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 On full dose darolutamide and due for ADT today. Stable scans. Residual neuropathy. CPM      Patient verbalizes understanding of above plan. Time provided for patient's questions. Patient instructed to reach out for any new concerning issues.     Mikki Bell, MSN, APRN-CNP  Acute Care Nurse Practitioner   Genitourinary () Oncology  [facility]  Phone: [phone]

## 2025-05-13 NOTE — PROGRESS NOTES
Arnex PT presents to infusion post NP visit; see provider note for assessment. PT received ordered injection without incident.

## 2025-05-14 LAB — TESTOSTERONE,TOTAL,LC-MS/MS: 11 NG/DL (ref 250–1100)

## 2025-05-15 LAB
TESTOSTERONE FREE (CHAN): 0.9 PG/ML (ref 30–135)
TESTOSTERONE,TOTAL,LC-MS/MS: 11 NG/DL (ref 250–1100)

## 2025-05-16 ENCOUNTER — TELEPHONE (OUTPATIENT)
Dept: HEMATOLOGY/ONCOLOGY | Facility: HOSPITAL | Age: 80
End: 2025-05-16
Payer: MEDICARE

## 2025-05-16 DIAGNOSIS — C61 CANCER OF PROSTATE (MULTI): ICD-10-CM

## 2025-05-16 NOTE — TELEPHONE ENCOUNTER
[facility] Refill Management:    Medication(s) Requested: darolutamide    Date of Request: 05/16/25 1:07 PM    - Labs in last 3 months: Yes    Date: 05/08/25  - Office visit in last 3 months: Yes    Date: 05/13/25  - Changes in medications in last 3 months: No  - Actionable labs or dose adjustments requiring physician clarification: No    Next FUV scheduled for 08/19/25 w/ Dr. Hurley    Approved for refill under Consult Agreement: Yes    Comments: N/A    Nash Luciano, PharmD, CSP  Clinical Pharm Specialist -  Oncology  [facility]  Phone #: [phone]  Fax #: [phone]  Email: [email or web address]

## 2025-05-16 NOTE — TELEPHONE ENCOUNTER
Anderson called in to request refill of Darolutamide Rx. He stated that [facility] will need to be called to confirm approval. Message sent to team.   Kelley COBOSN, RN CMSRN     Detail Level: Detailed

## 2025-05-17 NOTE — TELEPHONE ENCOUNTER
"Pt called with concern that Darolutamide prescription still requires authorization to [facility] company in order to fill.  He asked that this RN call the number for [facility] ([phone]) to expedite process.  This RN attempted, however the office is closed, likely as it is a Saturday.  Meanwhile, this RN assured pt that the medication refill for this medication has been authorized and submitted to the [facility] (a specialty Rx out of [place]) by Dr Hurley's team member, Davis HARDING Pharm D as of 5/16/25.  Additionally, this RN attempted to contact this pharmacy, however, the office is also closed until 0800 Mon 5/19.  Pt verbalized an understanding and is hopeful the medication arrives Mon or Tues at the latest as usually that is the expectation once the prescription is submitted from his past experience.  This RN instructed pt to call if it still had not arrived on Tues.  He has enough pills until then.  The pt was grateful for the communication and that this information would be documented so \"Dr Hurley would know\".  "

## 2025-05-21 ENCOUNTER — TELEPHONE (OUTPATIENT)
Dept: ADMISSION | Facility: HOSPITAL | Age: 80
End: 2025-05-21
Payer: MEDICARE

## 2025-05-21 DIAGNOSIS — C61 CANCER OF PROSTATE (MULTI): ICD-10-CM

## 2025-05-21 NOTE — TELEPHONE ENCOUNTER
Pt states that script for Nubeqa needs sent to [email or web address]- this is where he receives his medication from.   Pt is out of medication.   Contact number for [email or web address] is [phone]

## 2025-05-21 NOTE — TELEPHONE ENCOUNTER
Pt's dtr updated that pharmacist spoke with [facility] and they are awaiting a call from pt to set delivery.

## 2025-06-04 ENCOUNTER — LAB (OUTPATIENT)
Dept: LAB | Facility: CLINIC | Age: 80
End: 2025-06-04
Payer: MEDICARE

## 2025-06-04 DIAGNOSIS — E11.42 TYPE 2 DIABETES MELLITUS WITH DIABETIC POLYNEUROPATHY: Primary | ICD-10-CM

## 2025-06-04 LAB
EST. AVERAGE GLUCOSE BLD GHB EST-MCNC: 126 MG/DL
HBA1C MFR BLD: 6 % (ref ?–5.7)

## 2025-06-04 PROCEDURE — 83036 HEMOGLOBIN GLYCOSYLATED A1C: CPT | Mod: PARLAB

## 2025-06-04 PROCEDURE — 36415 COLL VENOUS BLD VENIPUNCTURE: CPT

## 2025-08-04 ENCOUNTER — TELEPHONE (OUTPATIENT)
Dept: HEMATOLOGY/ONCOLOGY | Facility: HOSPITAL | Age: 80
End: 2025-08-04
Payer: MEDICARE

## 2025-08-04 ENCOUNTER — TELEPHONE (OUTPATIENT)
Dept: ADMISSION | Facility: HOSPITAL | Age: 80
End: 2025-08-04
Payer: MEDICARE

## 2025-08-04 DIAGNOSIS — C61 CANCER OF PROSTATE (MULTI): ICD-10-CM

## 2025-08-14 ENCOUNTER — LAB (OUTPATIENT)
Dept: LAB | Facility: CLINIC | Age: 80
End: 2025-08-14
Payer: MEDICARE

## 2025-08-14 DIAGNOSIS — C61 CANCER OF PROSTATE (MULTI): ICD-10-CM

## 2025-08-14 DIAGNOSIS — C61 PROSTATE CANCER (MULTI): ICD-10-CM

## 2025-08-14 LAB — PSA SERPL-MCNC: 0.16 NG/ML

## 2025-08-14 PROCEDURE — 36415 COLL VENOUS BLD VENIPUNCTURE: CPT

## 2025-08-14 PROCEDURE — 84153 ASSAY OF PSA TOTAL: CPT | Mod: PARLAB

## 2025-08-14 PROCEDURE — 84402 ASSAY OF FREE TESTOSTERONE: CPT

## 2025-08-19 ENCOUNTER — OFFICE VISIT (OUTPATIENT)
Dept: HEMATOLOGY/ONCOLOGY | Facility: HOSPITAL | Age: 80
End: 2025-08-19
Payer: MEDICARE

## 2025-08-19 VITALS
WEIGHT: 183.86 LBS | RESPIRATION RATE: 16 BRPM | BODY MASS INDEX: 27.96 KG/M2 | TEMPERATURE: 97.5 F | OXYGEN SATURATION: 100 % | HEART RATE: 56 BPM | DIASTOLIC BLOOD PRESSURE: 72 MMHG | SYSTOLIC BLOOD PRESSURE: 142 MMHG

## 2025-08-19 DIAGNOSIS — C61 PROSTATE CANCER (MULTI): ICD-10-CM

## 2025-08-19 PROCEDURE — 1159F MED LIST DOCD IN RCRD: CPT | Performed by: STUDENT IN AN ORGANIZED HEALTH CARE EDUCATION/TRAINING PROGRAM

## 2025-08-19 PROCEDURE — 1126F AMNT PAIN NOTED NONE PRSNT: CPT | Performed by: STUDENT IN AN ORGANIZED HEALTH CARE EDUCATION/TRAINING PROGRAM

## 2025-08-19 PROCEDURE — 99214 OFFICE O/P EST MOD 30 MIN: CPT | Performed by: STUDENT IN AN ORGANIZED HEALTH CARE EDUCATION/TRAINING PROGRAM

## 2025-08-19 PROCEDURE — 1036F TOBACCO NON-USER: CPT | Performed by: STUDENT IN AN ORGANIZED HEALTH CARE EDUCATION/TRAINING PROGRAM

## 2025-08-19 ASSESSMENT — ENCOUNTER SYMPTOMS
HEADACHES: 0
NECK PAIN: 0
LEG SWELLING: 0
FATIGUE: 0
DIARRHEA: 0
VOMITING: 0
HOT FLASHES: 1
BACK PAIN: 0
DIFFICULTY URINATING: 0
CONSTIPATION: 0
HEMATURIA: 0
SHORTNESS OF BREATH: 0
UNEXPECTED WEIGHT CHANGE: 0
NUMBNESS: 0
EXTREMITY WEAKNESS: 0
NAUSEA: 0
PSYCHIATRIC NEGATIVE: 1
DYSURIA: 0
MYALGIAS: 0
ARTHRALGIAS: 0
FEVER: 0
EYES NEGATIVE: 1
COUGH: 0
HEMATOLOGIC/LYMPHATIC NEGATIVE: 1
APPETITE CHANGE: 0
DIZZINESS: 0
ABDOMINAL PAIN: 0
CHILLS: 0

## 2025-08-19 ASSESSMENT — PAIN SCALES - GENERAL: PAINLEVEL_OUTOF10: 0-NO PAIN

## 2025-10-27 ENCOUNTER — APPOINTMENT (OUTPATIENT)
Dept: HEMATOLOGY/ONCOLOGY | Facility: HOSPITAL | Age: 80
End: 2025-10-27
Payer: MEDICARE